# Patient Record
Sex: FEMALE | Race: ASIAN | Employment: FULL TIME | URBAN - METROPOLITAN AREA
[De-identification: names, ages, dates, MRNs, and addresses within clinical notes are randomized per-mention and may not be internally consistent; named-entity substitution may affect disease eponyms.]

---

## 2017-09-21 PROCEDURE — 88175 CYTOPATH C/V AUTO FLUID REDO: CPT | Performed by: OBSTETRICS & GYNECOLOGY

## 2018-05-16 ENCOUNTER — HOSPITAL ENCOUNTER (EMERGENCY)
Facility: HOSPITAL | Age: 28
Discharge: HOME | End: 2018-05-17
Attending: STUDENT IN AN ORGANIZED HEALTH CARE EDUCATION/TRAINING PROGRAM
Payer: COMMERCIAL

## 2018-05-16 DIAGNOSIS — R11.2 NON-INTRACTABLE VOMITING WITH NAUSEA, UNSPECIFIED VOMITING TYPE: ICD-10-CM

## 2018-05-16 DIAGNOSIS — N98.1 OVARIAN HYPERSTIMULATION SYNDROME: Primary | ICD-10-CM

## 2018-05-16 DIAGNOSIS — R10.9 ABDOMINAL PAIN, ACUTE: ICD-10-CM

## 2018-05-16 PROCEDURE — 96375 TX/PRO/DX INJ NEW DRUG ADDON: CPT

## 2018-05-16 PROCEDURE — 96361 HYDRATE IV INFUSION ADD-ON: CPT

## 2018-05-16 PROCEDURE — 81025 URINE PREGNANCY TEST: CPT

## 2018-05-16 PROCEDURE — 81003 URINALYSIS AUTO W/O SCOPE: CPT | Performed by: STUDENT IN AN ORGANIZED HEALTH CARE EDUCATION/TRAINING PROGRAM

## 2018-05-16 PROCEDURE — 96376 TX/PRO/DX INJ SAME DRUG ADON: CPT

## 2018-05-16 PROCEDURE — 86900 BLOOD TYPING SEROLOGIC ABO: CPT | Performed by: STUDENT IN AN ORGANIZED HEALTH CARE EDUCATION/TRAINING PROGRAM

## 2018-05-16 PROCEDURE — 99285 EMERGENCY DEPT VISIT HI MDM: CPT

## 2018-05-16 PROCEDURE — 85025 COMPLETE CBC W/AUTO DIFF WBC: CPT | Performed by: STUDENT IN AN ORGANIZED HEALTH CARE EDUCATION/TRAINING PROGRAM

## 2018-05-16 PROCEDURE — 96374 THER/PROPH/DIAG INJ IV PUSH: CPT

## 2018-05-16 PROCEDURE — 99284 EMERGENCY DEPT VISIT MOD MDM: CPT

## 2018-05-16 PROCEDURE — 86850 RBC ANTIBODY SCREEN: CPT | Performed by: STUDENT IN AN ORGANIZED HEALTH CARE EDUCATION/TRAINING PROGRAM

## 2018-05-16 PROCEDURE — 80053 COMPREHEN METABOLIC PANEL: CPT | Performed by: STUDENT IN AN ORGANIZED HEALTH CARE EDUCATION/TRAINING PROGRAM

## 2018-05-16 PROCEDURE — 86901 BLOOD TYPING SEROLOGIC RH(D): CPT | Performed by: STUDENT IN AN ORGANIZED HEALTH CARE EDUCATION/TRAINING PROGRAM

## 2018-05-16 RX ORDER — ONDANSETRON 2 MG/ML
4 INJECTION INTRAMUSCULAR; INTRAVENOUS ONCE
Status: COMPLETED | OUTPATIENT
Start: 2018-05-16 | End: 2018-05-17

## 2018-05-16 RX ORDER — HYDROMORPHONE HYDROCHLORIDE 1 MG/ML
0.5 INJECTION, SOLUTION INTRAMUSCULAR; INTRAVENOUS; SUBCUTANEOUS EVERY 30 MIN PRN
Status: DISCONTINUED | OUTPATIENT
Start: 2018-05-16 | End: 2018-05-17

## 2018-05-16 RX ORDER — METHYLPREDNISOLONE 16 MG/1
16 TABLET ORAL DAILY
COMMUNITY
End: 2018-10-10

## 2018-05-16 RX ORDER — CABERGOLINE 0.5 MG/1
0.25 TABLET ORAL
COMMUNITY
End: 2018-10-12

## 2018-05-16 RX ORDER — AZITHROMYCIN 500 MG/1
500 TABLET, FILM COATED ORAL DAILY
COMMUNITY
End: 2018-10-10 | Stop reason: ALTCHOICE

## 2018-05-17 ENCOUNTER — APPOINTMENT (OUTPATIENT)
Dept: CT IMAGING | Facility: HOSPITAL | Age: 28
End: 2018-05-17
Attending: STUDENT IN AN ORGANIZED HEALTH CARE EDUCATION/TRAINING PROGRAM
Payer: COMMERCIAL

## 2018-05-17 VITALS
HEIGHT: 69 IN | TEMPERATURE: 97 F | SYSTOLIC BLOOD PRESSURE: 113 MMHG | WEIGHT: 280 LBS | RESPIRATION RATE: 22 BRPM | DIASTOLIC BLOOD PRESSURE: 65 MMHG | BODY MASS INDEX: 41.47 KG/M2 | HEART RATE: 94 BPM | OXYGEN SATURATION: 90 %

## 2018-05-17 PROCEDURE — 74177 CT ABD & PELVIS W/CONTRAST: CPT | Performed by: STUDENT IN AN ORGANIZED HEALTH CARE EDUCATION/TRAINING PROGRAM

## 2018-05-17 RX ORDER — HYDROCODONE BITARTRATE AND ACETAMINOPHEN 5; 325 MG/1; MG/1
1-2 TABLET ORAL EVERY 4 HOURS PRN
Qty: 10 TABLET | Refills: 0 | Status: SHIPPED | OUTPATIENT
Start: 2018-05-17 | End: 2018-05-24

## 2018-05-17 RX ORDER — ONDANSETRON 4 MG/1
4 TABLET, ORALLY DISINTEGRATING ORAL EVERY 8 HOURS PRN
Qty: 10 TABLET | Refills: 0 | Status: SHIPPED | OUTPATIENT
Start: 2018-05-17 | End: 2018-05-24

## 2018-05-17 NOTE — ED INITIAL ASSESSMENT (HPI)
27YF c/c post op Pt state she had a egg removal on Tues.  Pt state that today she having abd pain, increased fatigue, and vomiting

## 2018-05-17 NOTE — ED NOTES
Attempted to call Dr. Mini Granados for consult with Dr. Kisha Bell and was only able to reach her RN on call.

## 2018-05-17 NOTE — ED PROVIDER NOTES
Patient Seen in: [facility] Emergency Department    History   Patient presents with:  Postop/Procedure Problem    Stated Complaint: post op problems     HPI    Patient is a 81-year-old female who presents emergency department reporting the abdominal friction rub. No murmur heard. Pulmonary/Chest: Effort normal. No respiratory distress. no wheezes. no rales. no tenderness. Abdominal: Soft. Bowel sounds are normal, no distension and no mass. Diffuse abdominal tenderness noted.  There is no reboun Final result               ANTIBODY SCREEN[[id number]]                                  Final result                 Please view results for these tests on the individual orders.    82 Hiral Kang (BLOOD TYPE)   ANTIBODY SCREEN   RAINBOW DRAW BLUE encounter diagnosis)  Abdominal pain, acute  Non-intractable vomiting with nausea, unspecified vomiting type    Disposition:  Discharge  5/17/2018  2:32 am    Follow-up:  your fertility specialitst    Call  at [address] [facility] for recheck        Medications Prescribed:

## 2018-05-21 ENCOUNTER — APPOINTMENT (OUTPATIENT)
Dept: GENERAL RADIOLOGY | Facility: HOSPITAL | Age: 28
End: 2018-05-21
Attending: EMERGENCY MEDICINE
Payer: COMMERCIAL

## 2018-05-21 ENCOUNTER — APPOINTMENT (OUTPATIENT)
Dept: CT IMAGING | Facility: HOSPITAL | Age: 28
End: 2018-05-21
Attending: EMERGENCY MEDICINE
Payer: COMMERCIAL

## 2018-05-21 ENCOUNTER — HOSPITAL ENCOUNTER (OUTPATIENT)
Facility: HOSPITAL | Age: 28
Setting detail: OBSERVATION
Discharge: HOME | End: 2018-05-23
Attending: EMERGENCY MEDICINE | Admitting: OBSTETRICS & GYNECOLOGY
Payer: COMMERCIAL

## 2018-05-21 DIAGNOSIS — N98.1 OVARIAN HYPERSTIMULATION SYNDROME: Primary | ICD-10-CM

## 2018-05-21 PROCEDURE — 93005 ELECTROCARDIOGRAM TRACING: CPT

## 2018-05-21 PROCEDURE — 81025 URINE PREGNANCY TEST: CPT

## 2018-05-21 PROCEDURE — 81001 URINALYSIS AUTO W/SCOPE: CPT | Performed by: EMERGENCY MEDICINE

## 2018-05-21 PROCEDURE — 74177 CT ABD & PELVIS W/CONTRAST: CPT | Performed by: EMERGENCY MEDICINE

## 2018-05-21 PROCEDURE — 71045 X-RAY EXAM CHEST 1 VIEW: CPT | Performed by: EMERGENCY MEDICINE

## 2018-05-21 PROCEDURE — 99285 EMERGENCY DEPT VISIT HI MDM: CPT

## 2018-05-21 PROCEDURE — 83735 ASSAY OF MAGNESIUM: CPT | Performed by: EMERGENCY MEDICINE

## 2018-05-21 PROCEDURE — 85025 COMPLETE CBC W/AUTO DIFF WBC: CPT | Performed by: EMERGENCY MEDICINE

## 2018-05-21 PROCEDURE — 84132 ASSAY OF SERUM POTASSIUM: CPT | Performed by: EMERGENCY MEDICINE

## 2018-05-21 PROCEDURE — 71275 CT ANGIOGRAPHY CHEST: CPT | Performed by: EMERGENCY MEDICINE

## 2018-05-21 PROCEDURE — 96374 THER/PROPH/DIAG INJ IV PUSH: CPT

## 2018-05-21 PROCEDURE — 84484 ASSAY OF TROPONIN QUANT: CPT | Performed by: EMERGENCY MEDICINE

## 2018-05-21 PROCEDURE — 84450 TRANSFERASE (AST) (SGOT): CPT | Performed by: EMERGENCY MEDICINE

## 2018-05-21 PROCEDURE — 80053 COMPREHEN METABOLIC PANEL: CPT | Performed by: EMERGENCY MEDICINE

## 2018-05-21 PROCEDURE — 96361 HYDRATE IV INFUSION ADD-ON: CPT

## 2018-05-21 PROCEDURE — 96376 TX/PRO/DX INJ SAME DRUG ADON: CPT

## 2018-05-21 PROCEDURE — 85610 PROTHROMBIN TIME: CPT | Performed by: EMERGENCY MEDICINE

## 2018-05-21 PROCEDURE — 93010 ELECTROCARDIOGRAM REPORT: CPT

## 2018-05-21 PROCEDURE — 83690 ASSAY OF LIPASE: CPT | Performed by: EMERGENCY MEDICINE

## 2018-05-21 RX ORDER — HYDROMORPHONE HYDROCHLORIDE 1 MG/ML
0.5 INJECTION, SOLUTION INTRAMUSCULAR; INTRAVENOUS; SUBCUTANEOUS ONCE
Status: COMPLETED | OUTPATIENT
Start: 2018-05-21 | End: 2018-05-21

## 2018-05-22 PROBLEM — N98.1 OVARIAN HYPERSTIMULATION SYNDROME: Status: ACTIVE | Noted: 2018-05-22

## 2018-05-22 PROCEDURE — 82962 GLUCOSE BLOOD TEST: CPT

## 2018-05-22 PROCEDURE — 85025 COMPLETE CBC W/AUTO DIFF WBC: CPT | Performed by: OBSTETRICS & GYNECOLOGY

## 2018-05-22 PROCEDURE — 84702 CHORIONIC GONADOTROPIN TEST: CPT | Performed by: OBSTETRICS & GYNECOLOGY

## 2018-05-22 PROCEDURE — P9045 ALBUMIN (HUMAN), 5%, 250 ML: HCPCS | Performed by: OBSTETRICS & GYNECOLOGY

## 2018-05-22 PROCEDURE — 80053 COMPREHEN METABOLIC PANEL: CPT | Performed by: OBSTETRICS & GYNECOLOGY

## 2018-05-22 RX ORDER — HYDROMORPHONE HYDROCHLORIDE 1 MG/ML
0.2 INJECTION, SOLUTION INTRAMUSCULAR; INTRAVENOUS; SUBCUTANEOUS EVERY 2 HOUR PRN
Status: DISCONTINUED | OUTPATIENT
Start: 2018-05-22 | End: 2018-05-23

## 2018-05-22 RX ORDER — ARIPIPRAZOLE 15 MG/1
40 TABLET ORAL EVERY 4 HOURS
Status: DISCONTINUED | OUTPATIENT
Start: 2018-05-22 | End: 2018-05-22

## 2018-05-22 RX ORDER — ENOXAPARIN SODIUM 100 MG/ML
0.5 INJECTION SUBCUTANEOUS DAILY
Status: DISCONTINUED | OUTPATIENT
Start: 2018-05-22 | End: 2018-05-22

## 2018-05-22 RX ORDER — ONDANSETRON 2 MG/ML
4 INJECTION INTRAMUSCULAR; INTRAVENOUS EVERY 6 HOURS PRN
Status: DISCONTINUED | OUTPATIENT
Start: 2018-05-22 | End: 2018-05-23

## 2018-05-22 RX ORDER — LEVOTHYROXINE SODIUM 0.05 MG/1
50 TABLET ORAL
COMMUNITY
End: 2020-01-04

## 2018-05-22 RX ORDER — ALBUMIN, HUMAN INJ 5% 5 %
500 SOLUTION INTRAVENOUS ONCE
Status: COMPLETED | OUTPATIENT
Start: 2018-05-22 | End: 2018-05-22

## 2018-05-22 RX ORDER — SODIUM CHLORIDE 9 MG/ML
INJECTION, SOLUTION INTRAVENOUS CONTINUOUS
Status: ACTIVE | OUTPATIENT
Start: 2018-05-22 | End: 2018-05-22

## 2018-05-22 RX ORDER — HYDROMORPHONE HYDROCHLORIDE 1 MG/ML
0.5 INJECTION, SOLUTION INTRAMUSCULAR; INTRAVENOUS; SUBCUTANEOUS ONCE
Status: COMPLETED | OUTPATIENT
Start: 2018-05-22 | End: 2018-05-22

## 2018-05-22 RX ORDER — SODIUM CHLORIDE 9 MG/ML
125 INJECTION, SOLUTION INTRAVENOUS CONTINUOUS
Status: DISCONTINUED | OUTPATIENT
Start: 2018-05-22 | End: 2018-05-23

## 2018-05-22 RX ORDER — FUROSEMIDE 10 MG/ML
20 INJECTION INTRAMUSCULAR; INTRAVENOUS ONCE
Status: COMPLETED | OUTPATIENT
Start: 2018-05-22 | End: 2018-05-22

## 2018-05-22 NOTE — CONSULTS
Pt is 32 y/o who is 6 days s/p egg retrieval - admitted for SOB and abdominal pain.  She is  not pg - is being followed by her R-E   Abdomen - mildly tender  /73 (BP Location: Left arm)   Pulse 72   Temp 97.6 °F (36.4 °C) (Oral)   Resp 18   Ht 5' 9\"

## 2018-05-22 NOTE — PROGRESS NOTES
[facility] Progress Note:  Anticoagulation Weight Dose Adjustment for enoxaparin (LOVENOX)    Lamar Aschoff is a 33year old female who has been prescribed enoxaparin (LOVENOX) for VTE prophylaxis.       Estimated Creatinine Clearance: 99 mL/min (based on S

## 2018-05-22 NOTE — IMAGING NOTE
Spoke with floor nurse Elder. Instructed patient may have clear liquid diet, to draw STAT PT/INR in a.m, verified patient is not on blood thinners (last dose of Lovenox in ER at 0317 5/22/18), and patient is consentable.  Scheduled procedure time 0800 in U

## 2018-05-22 NOTE — ED PROVIDER NOTES
Patient Seen in: [facility] Emergency Department    History   Patient presents with:  Dyspnea KIERRA SOB (respiratory)  Nausea/Vomiting/Diarrhea (gastrointestinal)    Stated Complaint: kierra/nausea    HPI    25-year-old female presents emergency room wit Temp 98.7 °F (37.1 °C) (Temporal)   Resp 18   Ht 175.3 cm (5' 9\")   Wt 127 kg   LMP 04/28/2018   SpO2 98%   BMI 41.35 kg/m²         Physical Exam    GENERAL: Patient is awake, alert, well-appearing, in no acute distress.   HEENT: Pupils equal round reactiv All other components within normal limits   CBC W/ DIFFERENTIAL - Abnormal; Notable for the following:     WBC 13.7 (*)     RBC 5.44 (*)     MCV 76.5 (*)     MCH 24.4 (*)     RDW 19.4 (*)     RDW-SD 50.1 (*)     Neutrophil Absolute Prelim 7.13 (*) thoracic aneurysm or dissection. New trace bilateral pleural effusions. Bilateral lower lobe subsegmental atelectasis. No consolidation or pneumothorax. No suspicious nodules or masses. Mediastinal and cardiac structures are unremarkable.  Visualize Unknown

## 2018-05-22 NOTE — ED NOTES
Assumed care, report received from [facility]. Pt resting in bed, family at bedside. She is in no acute distress. Pt dozing on and off, \"pain 5-6/10. \"

## 2018-05-23 ENCOUNTER — APPOINTMENT (OUTPATIENT)
Dept: ULTRASOUND IMAGING | Facility: HOSPITAL | Age: 28
End: 2018-05-23
Attending: OBSTETRICS & GYNECOLOGY
Payer: COMMERCIAL

## 2018-05-23 VITALS
SYSTOLIC BLOOD PRESSURE: 120 MMHG | HEART RATE: 75 BPM | DIASTOLIC BLOOD PRESSURE: 81 MMHG | HEIGHT: 69 IN | OXYGEN SATURATION: 100 % | WEIGHT: 280 LBS | TEMPERATURE: 98 F | BODY MASS INDEX: 41.47 KG/M2 | RESPIRATION RATE: 18 BRPM

## 2018-05-23 PROCEDURE — 49083 ABD PARACENTESIS W/IMAGING: CPT | Performed by: OBSTETRICS & GYNECOLOGY

## 2018-05-23 PROCEDURE — 85610 PROTHROMBIN TIME: CPT | Performed by: OBSTETRICS & GYNECOLOGY

## 2018-05-23 PROCEDURE — 76705 ECHO EXAM OF ABDOMEN: CPT | Performed by: OBSTETRICS & GYNECOLOGY

## 2018-05-23 PROCEDURE — 84132 ASSAY OF SERUM POTASSIUM: CPT | Performed by: OBSTETRICS & GYNECOLOGY

## 2018-05-23 RX ORDER — POTASSIUM CHLORIDE 20 MEQ/1
40 TABLET, EXTENDED RELEASE ORAL ONCE
Status: COMPLETED | OUTPATIENT
Start: 2018-05-23 | End: 2018-05-23

## 2018-05-23 NOTE — PAYOR COMM NOTE
--------------  ADMISSION REVIEW     Payor: Heron Cruz #:  [id number]  Authorization Number: N/A    Admit date: N/A  Admit time: N/A       Patient Seen in: [facility] Emergency Department    History   Stated Francesca (*)     All other components within normal limits   URINALYSIS WITH CULTURE REFLEX - Abnormal; Notable for the following:     Urine Color Colette (*)     Clarity Urine Hazy (*)     Spec Gravity 1.031 (*)     Protein Urine 30  (*)     Urobilinogen Urine 4.0 ( AND NAUSEA  Additional Information (per [facility]):  Comparison May 17, 2018    CTA chest with IV contrast  CT abdomen pelvis with IV contrast    IMPRESSION:    Chest:  No pulmonary embolus. Technically adequate study.  No thoracic aneurysm or diss - mildly tender  /73 (BP Location: Left arm)   Pulse 72   Temp 97.6 °F (36.4 °C) (Oral)   Resp 18   Ht 5' 9\" (1.753 m)   Wt 280 lb (127 kg)   LMP 04/28/2018   SpO2 100%   BMI 41.35 kg/m²   States SOB has improved  Labs significant for low albumin -

## 2018-05-23 NOTE — PROGRESS NOTES
NURSING DISCHARGE NOTE    Discharged Home via Wheelchair. Accompanied by Family member and Support staff  Belongings Taken by patient/family DISCUSSED D/C INSTRUCTIONS WITH PATIENT AND FAMILY . ANSWERED ALL QUESTIONS . VERBALIZED UNDERSTANDING .

## 2018-05-23 NOTE — PROGRESS NOTES
[facility]  GYN Progress  Note    Annemarie Self Patient Status:  Observation    1990 MRN [id number]   [facility] 3NW-A Attending Didi Mata MD   Hosp Day # 0 PCP Zana Garcia MD     SUBJECTIVE:    Patient admitted for abdo

## 2018-10-12 PROCEDURE — 87086 URINE CULTURE/COLONY COUNT: CPT | Performed by: OBSTETRICS & GYNECOLOGY

## 2018-10-22 PROCEDURE — 86850 RBC ANTIBODY SCREEN: CPT | Performed by: OBSTETRICS & GYNECOLOGY

## 2018-10-22 PROCEDURE — 86901 BLOOD TYPING SEROLOGIC RH(D): CPT | Performed by: OBSTETRICS & GYNECOLOGY

## 2018-10-22 PROCEDURE — 86900 BLOOD TYPING SEROLOGIC ABO: CPT | Performed by: OBSTETRICS & GYNECOLOGY

## 2018-10-22 PROCEDURE — 86780 TREPONEMA PALLIDUM: CPT | Performed by: OBSTETRICS & GYNECOLOGY

## 2018-10-22 PROCEDURE — 87389 HIV-1 AG W/HIV-1&-2 AB AG IA: CPT | Performed by: OBSTETRICS & GYNECOLOGY

## 2018-10-22 PROCEDURE — 86762 RUBELLA ANTIBODY: CPT | Performed by: OBSTETRICS & GYNECOLOGY

## 2018-11-29 PROCEDURE — 36415 COLL VENOUS BLD VENIPUNCTURE: CPT | Performed by: OBSTETRICS & GYNECOLOGY

## 2018-11-29 PROCEDURE — 82105 ALPHA-FETOPROTEIN SERUM: CPT | Performed by: OBSTETRICS & GYNECOLOGY

## 2018-12-17 ENCOUNTER — HOSPITAL ENCOUNTER (INPATIENT)
Facility: HOSPITAL | Age: 28
LOS: 4 days | Discharge: HOME | DRG: 819 | End: 2018-12-21
Attending: OBSTETRICS & GYNECOLOGY | Admitting: OBSTETRICS & GYNECOLOGY
Payer: COMMERCIAL

## 2018-12-17 PROBLEM — O34.32 INCOMPETENT CERVIX IN PREGNANCY, ANTEPARTUM, SECOND TRIMESTER (HCC): Status: ACTIVE | Noted: 2018-12-17

## 2018-12-17 PROBLEM — O34.32 INCOMPETENT CERVIX IN PREGNANCY, ANTEPARTUM, SECOND TRIMESTER: Status: ACTIVE | Noted: 2018-12-17

## 2018-12-17 LAB
BASOPHILS # BLD AUTO: 0.06 X10(3) UL (ref 0–0.1)
BASOPHILS NFR BLD AUTO: 0.6 %
EOSINOPHIL # BLD AUTO: 0.26 X10(3) UL (ref 0–0.3)
EOSINOPHIL NFR BLD AUTO: 2.4 %
ERYTHROCYTE [DISTWIDTH] IN BLOOD BY AUTOMATED COUNT: 14.2 % (ref 11.5–16)
HCT VFR BLD AUTO: 42.9 % (ref 34–50)
HGB BLD-MCNC: 13.5 G/DL (ref 12–16)
IMMATURE GRANULOCYTE COUNT: 0.04 X10(3) UL (ref 0–1)
IMMATURE GRANULOCYTE RATIO %: 0.4 %
LYMPHOCYTES # BLD AUTO: 3.32 X10(3) UL (ref 0.9–4)
LYMPHOCYTES NFR BLD AUTO: 30.6 %
MCH RBC QN AUTO: 27.4 PG (ref 27–33.2)
MCHC RBC AUTO-ENTMCNC: 31.5 G/DL (ref 31–37)
MCV RBC AUTO: 87 FL (ref 81–100)
MONOCYTES # BLD AUTO: 0.74 X10(3) UL (ref 0.1–1)
MONOCYTES NFR BLD AUTO: 6.8 %
NEUTROPHIL ABS PRELIM: 6.42 X10 (3) UL (ref 1.3–6.7)
NEUTROPHILS # BLD AUTO: 6.42 X10(3) UL (ref 1.3–6.7)
NEUTROPHILS NFR BLD AUTO: 59.2 %
PLATELET # BLD AUTO: 264 10(3)UL (ref 150–450)
RBC # BLD AUTO: 4.93 X10(6)UL (ref 3.8–5.1)
RED CELL DISTRIBUTION WIDTH-SD: 45.6 FL (ref 35.1–46.3)
WBC # BLD AUTO: 10.8 X10(3) UL (ref 4–13)

## 2018-12-17 PROCEDURE — 85025 COMPLETE CBC W/AUTO DIFF WBC: CPT | Performed by: OBSTETRICS & GYNECOLOGY

## 2018-12-17 RX ORDER — INDOMETHACIN 50 MG/1
100 CAPSULE ORAL ONCE
Status: COMPLETED | OUTPATIENT
Start: 2018-12-17 | End: 2018-12-17

## 2018-12-17 RX ORDER — CALCIUM CARBONATE 200(500)MG
1000 TABLET,CHEWABLE ORAL
Status: DISCONTINUED | OUTPATIENT
Start: 2018-12-17 | End: 2018-12-21

## 2018-12-17 RX ORDER — ACETAMINOPHEN 500 MG
500 TABLET ORAL EVERY 6 HOURS PRN
Status: DISCONTINUED | OUTPATIENT
Start: 2018-12-17 | End: 2018-12-21

## 2018-12-17 RX ORDER — DOCUSATE SODIUM 100 MG/1
100 CAPSULE, LIQUID FILLED ORAL 2 TIMES DAILY
Status: DISCONTINUED | OUTPATIENT
Start: 2018-12-17 | End: 2018-12-21

## 2018-12-17 RX ORDER — LEVOTHYROXINE SODIUM 0.05 MG/1
50 TABLET ORAL DAILY
Status: DISCONTINUED | OUTPATIENT
Start: 2018-12-18 | End: 2018-12-21

## 2018-12-17 RX ORDER — CHOLECALCIFEROL (VITAMIN D3) 25 MCG
1 TABLET,CHEWABLE ORAL DAILY
Status: DISCONTINUED | OUTPATIENT
Start: 2018-12-17 | End: 2018-12-18

## 2018-12-17 RX ORDER — LEVOTHYROXINE SODIUM 0.05 MG/1
50 TABLET ORAL
Status: DISCONTINUED | OUTPATIENT
Start: 2018-12-17 | End: 2018-12-17

## 2018-12-17 RX ORDER — INDOMETHACIN 50 MG/1
50 CAPSULE ORAL EVERY 6 HOURS
Status: DISCONTINUED | OUTPATIENT
Start: 2018-12-18 | End: 2018-12-18

## 2018-12-17 RX ORDER — ACETAMINOPHEN 500 MG
1000 TABLET ORAL EVERY 6 HOURS PRN
Status: DISCONTINUED | OUTPATIENT
Start: 2018-12-17 | End: 2018-12-21

## 2018-12-17 RX ORDER — DEXTROSE, SODIUM CHLORIDE, SODIUM LACTATE, POTASSIUM CHLORIDE, AND CALCIUM CHLORIDE 5; .6; .31; .03; .02 G/100ML; G/100ML; G/100ML; G/100ML; G/100ML
INJECTION, SOLUTION INTRAVENOUS CONTINUOUS
Status: DISCONTINUED | OUTPATIENT
Start: 2018-12-17 | End: 2018-12-20

## 2018-12-17 RX ORDER — ZOLPIDEM TARTRATE 5 MG/1
5 TABLET ORAL NIGHTLY PRN
Status: DISCONTINUED | OUTPATIENT
Start: 2018-12-17 | End: 2018-12-21

## 2018-12-18 PROCEDURE — 59320 REVISION OF CERVIX: CPT

## 2018-12-18 PROCEDURE — 76815 OB US LIMITED FETUS(S): CPT | Performed by: OBSTETRICS & GYNECOLOGY

## 2018-12-18 PROCEDURE — 76817 TRANSVAGINAL US OBSTETRIC: CPT | Performed by: OBSTETRICS & GYNECOLOGY

## 2018-12-18 PROCEDURE — 0UVC7ZZ RESTRICTION OF CERVIX, VIA NATURAL OR ARTIFICIAL OPENING: ICD-10-PCS | Performed by: OBSTETRICS & GYNECOLOGY

## 2018-12-18 RX ORDER — INDOMETHACIN 50 MG/1
100 CAPSULE ORAL ONCE
Status: COMPLETED | OUTPATIENT
Start: 2018-12-18 | End: 2018-12-18

## 2018-12-18 RX ORDER — METRONIDAZOLE 500 MG/100ML
500 INJECTION, SOLUTION INTRAVENOUS EVERY 8 HOURS
Status: COMPLETED | OUTPATIENT
Start: 2018-12-18 | End: 2018-12-20

## 2018-12-18 RX ORDER — SODIUM CHLORIDE, SODIUM LACTATE, POTASSIUM CHLORIDE, CALCIUM CHLORIDE 600; 310; 30; 20 MG/100ML; MG/100ML; MG/100ML; MG/100ML
INJECTION, SOLUTION INTRAVENOUS CONTINUOUS
Status: DISCONTINUED | OUTPATIENT
Start: 2018-12-18 | End: 2018-12-21

## 2018-12-18 RX ORDER — CEFAZOLIN SODIUM/WATER 2 G/20 ML
2 SYRINGE (ML) INTRAVENOUS EVERY 8 HOURS
Status: COMPLETED | OUTPATIENT
Start: 2018-12-18 | End: 2018-12-20

## 2018-12-18 RX ORDER — NITROGLYCERIN 20 MG/100ML
INJECTION INTRAVENOUS
Status: COMPLETED
Start: 2018-12-18 | End: 2018-12-18

## 2018-12-18 RX ORDER — CHOLECALCIFEROL (VITAMIN D3) 25 MCG
1 TABLET,CHEWABLE ORAL DAILY
Status: DISCONTINUED | OUTPATIENT
Start: 2018-12-18 | End: 2018-12-21

## 2018-12-18 RX ORDER — CEFAZOLIN SODIUM/WATER 2 G/20 ML
SYRINGE (ML) INTRAVENOUS
Status: DISCONTINUED | OUTPATIENT
Start: 2018-12-18 | End: 2018-12-20

## 2018-12-18 RX ORDER — NITROGLYCERIN 20 MG/100ML
100 INJECTION INTRAVENOUS ONCE
Status: COMPLETED | OUTPATIENT
Start: 2018-12-18 | End: 2018-12-18

## 2018-12-18 RX ORDER — INDOMETHACIN 50 MG/1
50 CAPSULE ORAL EVERY 6 HOURS
Status: DISCONTINUED | OUTPATIENT
Start: 2018-12-19 | End: 2018-12-20

## 2018-12-18 RX ORDER — ONDANSETRON 2 MG/ML
4 INJECTION INTRAMUSCULAR; INTRAVENOUS ONCE AS NEEDED
Status: ACTIVE | OUTPATIENT
Start: 2018-12-18 | End: 2018-12-18

## 2018-12-18 NOTE — PLAN OF CARE
Problem: Patient/Family Goals  Goal: Patient/Family Long Term Goal  Patient's Long Term Goal: maintain pregnancy    Interventions:  -   - See additional Care Plan goals for specific interventions  Outcome: Progressing    Goal: Patient/Family [address]eda

## 2018-12-18 NOTE — PLAN OF CARE
Problem: Patient/Family Goals  Goal: Patient/Family Short Term Goal  Patient's Short Term Goal: positive support for hospitalization    Interventions:   -   - See additional Care Plan goals for specific interventions  Outcome: Progressing      Problem: ANT Progressing      Problem: PAIN - ADULT  Goal: Verbalizes/displays adequate comfort level or patient's stated pain goal  INTERVENTIONS:  - Encourage pt to monitor pain and request assistance  - Assess pain using appropriate pain scale  - Administer analgesi

## 2018-12-18 NOTE — PROGRESS NOTES
Pt is a 33year old female admitted to 117/117-A. Patient presents with:   Complication: pt had u/s in office today cervix was shown to be dilated     Pt is  19w4d intra-uterine pregnancy. History obtained, consents signed.  Oriented to r

## 2018-12-18 NOTE — CONSULTS
[facility]  Maternal-Fetal Medicine Inpatient Consultation    Date of Admission:  12/17/2018  Date of Consult:  12/18/2018    Reason for Consult:   Incompetent cervix, morbid obesity, IVF twin gestation    History of Present Illness:  Rhonda Negron Problems Maternal Grandfather    • No Known Problems Paternal Grandmother    • No Known Problems Paternal Grandfather    • Cancer Maternal Aunt         breast   • Breast Cancer Maternal Aunt       reports that  has never smoked.  she has never used smokeles HGB 13.5 12/17/2018    HCT 42.9 12/17/2018    .0 12/17/2018       Fetal Ultrasound: See imaging note      NARRATIVE:   PREOPERATIVE COUNSELING  I reviewed that cerclage may help reduce the risk of a previable birth or reduce the risk or delay the tonight    Thank you for allowing me to participate in the care of your patient. Please do not hesitate to contact me if additional questions or concerns arise.   The majority of the time (>50%) was spent in review of records, consultation and coordination

## 2018-12-18 NOTE — PROGRESS NOTES
Pericare given and underpad changed. Pt moves well from side to side. To antepartum room via patient bed in stable condition.

## 2018-12-18 NOTE — PLAN OF CARE
Rn at bedside. Pt in trendelenburg. toco tracing. plan of care discussed, pt verbalizes understanding. Pt denies any need at this time.

## 2018-12-18 NOTE — OPERATIVE REPORT
Preoperative Diagnosis:  1. Twin IUP at 19w5d                                      2.   Incompetent cervix                                      3.   Morbid obesity     4.   IVF pregnancy    Postoperative diagnosis:    Same    Procedure:   Rescue Cerclage

## 2018-12-18 NOTE — H&P
[facility] Patient Status:  Observation    1990 MRN [id number]   Location [address] [place] [address] Attending Rosendo Lewis MD   Hosp Day # 0 PCP Joo Stewart MD     Date of Admission:  20 Cap Take 1 capsule by mouth daily.  Disp:  Rfl:  12/16/2018 at 2130   Levothyroxine Sodium 50 MCG Oral Tab Take 50 mcg by mouth before breakfast. Disp:  Rfl:  12/16/2018 at 2130     Allergies:   Dog Dander [Dander]     RASH  Pollen                  RASH

## 2018-12-18 NOTE — PROGRESS NOTES
Twin A fhr 145 via doppler in recovery room, unable to obtain twin B, dr Eladio Shultz in recovery and located via ultrasound second twin and fhr noted at 150 bpm. Dr Connie Wilcox and Dr Eladio Shultz and pt aware.

## 2018-12-18 NOTE — PROGRESS NOTES
Report given to Tito Tolentino Rn d/t shift change. Pt stable on bedrest in trendelenburg. POC explained to pt and . Pt verbalized understanding. Call light with reach.

## 2018-12-18 NOTE — CM/SW NOTE
Team rounds done on pt. Team reviewed pt orders, pt plan of care, and possible discharge orders. Team present for rounds: C. [address] Jean Raymond, RN ELIZABETH, and RN caring for pt.

## 2018-12-18 NOTE — PROGRESS NOTES
S: Pt not feeling ctx or bleeding. Babies moving.     O:   12/18/18  0723   BP: 124/67   Pulse: 91   Resp: 17   Temp: 97.9 °F (36.6 °C)   FHT present x2  Abdomen: soft NT ND    Recent Labs   Lab  12/17/18 2025   RBC  4.93   HGB  13.5   HCT  42.9   MCV  87

## 2018-12-18 NOTE — IMAGING NOTE
General Evaluation:  Fetus 1:  Fetal heart activity: present. Fetal heart rate: 132 bpm.   Presentation: breech. Fetal movement: visible. Amniotic Fluid: normal.   Placenta: anterior. Fetus 2:  Fetal heart activity: present.  Fetal heart rate: 136 b

## 2018-12-19 PROCEDURE — 76810 OB US >/= 14 WKS ADDL FETUS: CPT | Performed by: OBSTETRICS & GYNECOLOGY

## 2018-12-19 PROCEDURE — 76811 OB US DETAILED SNGL FETUS: CPT | Performed by: OBSTETRICS & GYNECOLOGY

## 2018-12-19 PROCEDURE — 76805 OB US >/= 14 WKS SNGL FETUS: CPT

## 2018-12-19 PROCEDURE — 76817 TRANSVAGINAL US OBSTETRIC: CPT | Performed by: OBSTETRICS & GYNECOLOGY

## 2018-12-19 NOTE — PROGRESS NOTES
Sybil Patient Status:  Inpatient    1990 MRN [id number]   Location [address] [facility] Attending Baljeet Chase MD   Hosp Day # 2 PCP Yue Lemus MD     SUBJECTIVE:    Admitted for rescue c

## 2018-12-19 NOTE — PROGRESS NOTES
Received report from [place]. Assumed care of patient.

## 2018-12-19 NOTE — IMAGING NOTE
History: Age: 33 years. : 3 Para: 2. Previous pregnancies: Children born : 2.  Living children: 2.   Obstetric History:   Details on previous pregnancies: Living children <37W: 2.  ______________________________________________________________ 20w6d)  .1 mm 38th% 19w5d (19w0d to 20w2d)  FL 33.0 mm 59th% 20w2d (18w4d to 22w1d)  OFD 57.8 mm 26th% 19w3d  TCD 18.5 mm 12th% 18w4d  HUM 32.5 mm 82nd% 20w6d  VENTRp 6.6 mm n/a  CM 3.4 mm 8th%  NUCHAL FOLD 3.38 mm  HC/AC Ratio 1.161  40th%  FL/AC Ra mm  Cervical length with fundal pressure: 15 mm. Funnel width: 14.0 mm. Funnel length: 22.0 mm.    Cervix post-stitch: 15.0 mm.  ____________________________________________________________________________  Comments:    Ultrasound findings:      TWIN A

## 2018-12-19 NOTE — CONSULTS
[facility]    Maternal Fetal Medicine follow-up consult note    Maty King Patient Status:  Inpatient    1990 MRN [id number]   Location [address] [facility] Attending Virgie Arcos MD   Hosp Day # 2 PCP Mil Frgaoso MD     HARDING vaginal progesterone  · Complete the antibiotic course as written  · Indocin is to complete tonight at midnight  · Anticipate she will be cleared for discharge by the end of the week.   She is to be on pelvic rest at home and modified bedrest.  She is to ha

## 2018-12-20 RX ORDER — INDOMETHACIN 50 MG/1
CAPSULE ORAL
Status: COMPLETED
Start: 2018-12-20 | End: 2018-12-20

## 2018-12-20 RX ORDER — INDOMETHACIN 50 MG/1
50 CAPSULE ORAL ONCE
Status: COMPLETED | OUTPATIENT
Start: 2018-12-20 | End: 2018-12-20

## 2018-12-20 RX ORDER — ENOXAPARIN SODIUM 100 MG/ML
40 INJECTION SUBCUTANEOUS DAILY
Status: DISCONTINUED | OUTPATIENT
Start: 2018-12-20 | End: 2018-12-21

## 2018-12-20 NOTE — CONSULTS
[facility]    Maternal Fetal Medicine follow-up consult note    Miky Galvez Patient Status:  Inpatient    1990 MRN [id number]   Location [address] [facility] Attending Tung Carson MD   Hosp Day # 3 PCP Estela Solorio MD     HARDING views in about 4 weeks. · Additional follow-up in one week for CL after discharge will be arranged at the time of discharge    Questions/concerns were discussed with patient and/or family at bedside. Discussed with Dr. Sarah Beth Ramos.   Total Time spent with pa

## 2018-12-20 NOTE — PROGRESS NOTES
Sybil Patient Status:  Inpatient    1990 MRN [id number]   Location [address] [place] [address] Attending Nahum Reynolds MD   Hosp Day # 3 PCP Xavier Juares MD     SUBJECTIVE:    Interval History:

## 2018-12-21 VITALS
TEMPERATURE: 99 F | WEIGHT: 289 LBS | OXYGEN SATURATION: 100 % | SYSTOLIC BLOOD PRESSURE: 117 MMHG | HEIGHT: 69 IN | DIASTOLIC BLOOD PRESSURE: 66 MMHG | RESPIRATION RATE: 18 BRPM | BODY MASS INDEX: 42.8 KG/M2 | HEART RATE: 85 BPM

## 2018-12-21 RX ORDER — ENOXAPARIN SODIUM 100 MG/ML
40 INJECTION SUBCUTANEOUS DAILY
Qty: 30 SYRINGE | Refills: 4 | Status: SHIPPED | OUTPATIENT
Start: 2018-12-21 | End: 2019-01-15

## 2018-12-21 NOTE — CONSULTS
[facility]    Maternal Fetal Medicine Progress Note    Maty King Patient Status:  Inpatient    1990 MRN [id number]   Location [address] [facility] Attending Virgie Arcos MD   Hosp Day # 4 PCP Mil Fragoso MD     SUBJECTIVE: with patient and coordinating care:  15 minutes    Gypsy Collins.  Barbara Hinds M.D.  12/21/2018  10:38 AM

## 2018-12-21 NOTE — PROGRESS NOTES
Discharged to home per wheelchair in stable condition with written and verbal instructions. All questions answered.

## 2018-12-21 NOTE — PROGRESS NOTES
[facility] Patient Status:  Inpatient    1990 MRN [id number]   Location [address] [facility] Attending Jojo Stein MD   Hosp Day # 4 PCP Genoveva Kirk MD     SUBJECTIVE:    Interval History: Arturo Valadez

## 2018-12-24 NOTE — PAYOR COMM NOTE
--------------  ADMISSION REVIEW     Payor: Heron Lima [facility] #:  [id number]  Authorization Number: [id number]     Admit date: 12/17/18  Admit time: [id number] Juliann Hernandez       Admitting Physician: Mejia Marie MD  Attending Physician:  Fernando Scott Father    • High Blood Pressure Father    • Heart Disease Father    • Heart Surgery Father    • High Blood Pressure Mother    • No Known Problems Maternal Grandmother    • No Known Problems Maternal Grandfather    • No Known Problems Paternal Grandmother Consultation  Reason for Consult:   Incompetent cervix, IVF twin gestation  /67   Pulse 91   Temp 97.9 °F (36.6 °C) (Oral)   Resp 17   Ht 5' 9\" (1.753 m)   Wt 289 lb (131.1 kg)   LMP 04/28/2018   BMI 42.68 kg/m²     IMPRESSION:   1.   Twin IUP at 19w MEDICATIONS ADMINISTERED IN LAST 1 DAY:   lactated ringers infusion, , Intravenous, Continuous  •  influenza vaccine split quad (FLULAVAL) ages 6 months to 65 years inj 0.5ml, 0.5 mL, Intramuscular, Prior to discharge  •  dextrose 5 % /lactated ringers

## 2018-12-27 NOTE — PROGRESS NOTES
Dayton Case    Dear Dr. Rachael Main    Thank you for requesting ultrasound evaluation and maternal fetal medicine consultation on your patient Alton Gan.   As you are aware she is a 33year old female  with a twin pregna follow-up  Appropriate interval growth is noted. The patient denies any signs or symptoms of  labor. There is no clinical evidence of preeclampsia. See follow-up recommendations below. S/P RESCUE CERCLAGE  Performed on 18 by Dr. Tiffany Harrell.   C

## 2018-12-28 ENCOUNTER — OFFICE VISIT (OUTPATIENT)
Dept: PERINATAL CARE | Facility: HOSPITAL | Age: 28
End: 2018-12-28
Attending: OBSTETRICS & GYNECOLOGY
Payer: COMMERCIAL

## 2018-12-28 VITALS
BODY MASS INDEX: 42.95 KG/M2 | HEIGHT: 69 IN | DIASTOLIC BLOOD PRESSURE: 87 MMHG | WEIGHT: 290 LBS | HEART RATE: 120 BPM | SYSTOLIC BLOOD PRESSURE: 136 MMHG

## 2018-12-28 DIAGNOSIS — O34.32 INCOMPETENT CERVIX IN PREGNANCY, ANTEPARTUM, SECOND TRIMESTER: ICD-10-CM

## 2018-12-28 DIAGNOSIS — O30.042 DICHORIONIC DIAMNIOTIC TWIN PREGNANCY IN SECOND TRIMESTER: ICD-10-CM

## 2018-12-28 PROCEDURE — 76815 OB US LIMITED FETUS(S): CPT | Performed by: OBSTETRICS & GYNECOLOGY

## 2018-12-28 PROCEDURE — 99213 OFFICE O/P EST LOW 20 MIN: CPT | Performed by: OBSTETRICS & GYNECOLOGY

## 2018-12-28 PROCEDURE — 76817 TRANSVAGINAL US OBSTETRIC: CPT | Performed by: OBSTETRICS & GYNECOLOGY

## 2019-01-05 NOTE — DISCHARGE SUMMARY
[facility]    Discharge Summary    [facility] Patient Status:  Inpatient    1990 MRN [id number]   Location [address] [facility] Attending No att. providers found   Hosp Day # 4 PCP Eliseo Warner MD     Date of Admission:  placement    Complications: none    Discharge Condition: Stable         Discharge Medications:      Discharge Medications      START taking these medications      Instructions Prescription details   Enoxaparin Sodium 40 MG/0.4ML Soln  Commonly known as:  L TAMMY  1/4/2019  9:28 PM

## 2019-01-17 NOTE — PROGRESS NOTES
Ramirez Harry  Dear Dr. José Manuel Gandara     Thank you for requesting ultrasound evaluation and maternal fetal medicine consultation on your patient Vimal Crockett.   As you are aware she is a 33year old female  with a twin preg FACOG    Image quality:  Limited  Situs:  Normal  Position of stomach:  Left sided  Heart size:  Normal  Position of apex:  normal  Systemic veins:  suboptimal  Pulmonary veins:  suboptimal  Atrial septum:  suboptimal  Flow at foramen ovale:  Unable to det conception  · Incompetent cervix, rescue cerclage 12/18/18 by Dr. Garsia Flank:   · Continue nightly vaginal progesterone  · Concur with plan for prophylactic enoxaparin while the patient remains on home bedrest  · Continue monthly growth ultra

## 2019-01-18 ENCOUNTER — OFFICE VISIT (OUTPATIENT)
Dept: PERINATAL CARE | Facility: HOSPITAL | Age: 29
End: 2019-01-18
Attending: OBSTETRICS & GYNECOLOGY
Payer: COMMERCIAL

## 2019-01-18 VITALS
DIASTOLIC BLOOD PRESSURE: 81 MMHG | WEIGHT: 291 LBS | BODY MASS INDEX: 43 KG/M2 | HEART RATE: 121 BPM | SYSTOLIC BLOOD PRESSURE: 138 MMHG

## 2019-01-18 DIAGNOSIS — O34.32 INCOMPETENT CERVIX IN PREGNANCY, ANTEPARTUM, SECOND TRIMESTER: ICD-10-CM

## 2019-01-18 DIAGNOSIS — O09.812 PREGNANCY RESULTING FROM ASSISTED REPRODUCTIVE TECHNOLOGY, SECOND TRIMESTER: ICD-10-CM

## 2019-01-18 DIAGNOSIS — O30.042 DICHORIONIC DIAMNIOTIC TWIN PREGNANCY IN SECOND TRIMESTER: ICD-10-CM

## 2019-01-18 PROCEDURE — 93325 DOPPLER ECHO COLOR FLOW MAPG: CPT | Performed by: OBSTETRICS & GYNECOLOGY

## 2019-01-18 PROCEDURE — 76825 ECHO EXAM OF FETAL HEART: CPT | Performed by: OBSTETRICS & GYNECOLOGY

## 2019-01-18 PROCEDURE — 99215 OFFICE O/P EST HI 40 MIN: CPT | Performed by: OBSTETRICS & GYNECOLOGY

## 2019-01-18 PROCEDURE — 76827 ECHO EXAM OF FETAL HEART: CPT | Performed by: OBSTETRICS & GYNECOLOGY

## 2019-01-22 ENCOUNTER — HOSPITAL ENCOUNTER (INPATIENT)
Facility: HOSPITAL | Age: 29
LOS: 7 days | Discharge: HOME | End: 2019-01-29
Attending: OBSTETRICS & GYNECOLOGY | Admitting: OBSTETRICS & GYNECOLOGY
Payer: COMMERCIAL

## 2019-01-22 PROBLEM — Z34.90 PREGNANCY (HCC): Status: ACTIVE | Noted: 2019-01-22

## 2019-01-22 PROBLEM — Z34.90 PREGNANCY: Status: ACTIVE | Noted: 2019-01-22

## 2019-01-23 ENCOUNTER — APPOINTMENT (OUTPATIENT)
Dept: ULTRASOUND IMAGING | Facility: HOSPITAL | Age: 29
End: 2019-01-23
Attending: OBSTETRICS & GYNECOLOGY
Payer: COMMERCIAL

## 2019-01-23 LAB
ANTIBODY SCREEN: NEGATIVE
BASOPHILS # BLD AUTO: 0.03 X10(3) UL (ref 0–0.1)
BASOPHILS NFR BLD AUTO: 0.3 %
EOSINOPHIL # BLD AUTO: 0.18 X10(3) UL (ref 0–0.3)
EOSINOPHIL NFR BLD AUTO: 1.8 %
ERYTHROCYTE [DISTWIDTH] IN BLOOD BY AUTOMATED COUNT: 14.3 % (ref 11.5–16)
FETAL FIBRINECTIN: POSITIVE
HCT VFR BLD AUTO: 39.2 % (ref 34–50)
HGB BLD-MCNC: 13.3 G/DL (ref 12–16)
IMMATURE GRANULOCYTE COUNT: 0.05 X10(3) UL (ref 0–1)
IMMATURE GRANULOCYTE RATIO %: 0.5 %
LYMPHOCYTES # BLD AUTO: 2.84 X10(3) UL (ref 0.9–4)
LYMPHOCYTES NFR BLD AUTO: 27.8 %
MCH RBC QN AUTO: 28.9 PG (ref 27–33.2)
MCHC RBC AUTO-ENTMCNC: 33.9 G/DL (ref 31–37)
MCV RBC AUTO: 85 FL (ref 81–100)
MONOCYTES # BLD AUTO: 0.84 X10(3) UL (ref 0.1–1)
MONOCYTES NFR BLD AUTO: 8.2 %
NEUTROPHIL ABS PRELIM: 6.27 X10 (3) UL (ref 1.3–6.7)
NEUTROPHILS # BLD AUTO: 6.27 X10(3) UL (ref 1.3–6.7)
NEUTROPHILS NFR BLD AUTO: 61.4 %
PLATELET # BLD AUTO: 225 10(3)UL (ref 150–450)
RBC # BLD AUTO: 4.61 X10(6)UL (ref 3.8–5.1)
RED CELL DISTRIBUTION WIDTH-SD: 43.6 FL (ref 35.1–46.3)
RH BLOOD TYPE: POSITIVE
T PALLIDUM AB SER QL IA: NONREACTIVE
WBC # BLD AUTO: 10.2 X10(3) UL (ref 4–13)

## 2019-01-23 PROCEDURE — 76817 TRANSVAGINAL US OBSTETRIC: CPT | Performed by: OBSTETRICS & GYNECOLOGY

## 2019-01-23 PROCEDURE — 76810 OB US >/= 14 WKS ADDL FETUS: CPT | Performed by: OBSTETRICS & GYNECOLOGY

## 2019-01-23 PROCEDURE — 76805 OB US >/= 14 WKS SNGL FETUS: CPT | Performed by: OBSTETRICS & GYNECOLOGY

## 2019-01-23 PROCEDURE — 3E0233Z INTRODUCTION OF ANTI-INFLAMMATORY INTO MUSCLE, PERCUTANEOUS APPROACH: ICD-10-PCS | Performed by: OBSTETRICS & GYNECOLOGY

## 2019-01-23 RX ORDER — BETAMETHASONE SODIUM PHOSPHATE AND BETAMETHASONE ACETATE 3; 3 MG/ML; MG/ML
INJECTION, SUSPENSION INTRA-ARTICULAR; INTRALESIONAL; INTRAMUSCULAR; SOFT TISSUE
Status: COMPLETED
Start: 2019-01-23 | End: 2019-01-23

## 2019-01-23 RX ORDER — LEVOTHYROXINE SODIUM 0.05 MG/1
50 TABLET ORAL
Status: DISCONTINUED | OUTPATIENT
Start: 2019-01-23 | End: 2019-01-23

## 2019-01-23 RX ORDER — ACETAMINOPHEN 500 MG
500 TABLET ORAL EVERY 6 HOURS PRN
Status: DISCONTINUED | OUTPATIENT
Start: 2019-01-23 | End: 2019-01-25

## 2019-01-23 RX ORDER — ACETAMINOPHEN 500 MG
1000 TABLET ORAL EVERY 6 HOURS PRN
Status: DISCONTINUED | OUTPATIENT
Start: 2019-01-23 | End: 2019-01-25

## 2019-01-23 RX ORDER — INDOMETHACIN 50 MG/1
100 CAPSULE ORAL ONCE
Status: COMPLETED | OUTPATIENT
Start: 2019-01-23 | End: 2019-01-23

## 2019-01-23 RX ORDER — DOCUSATE SODIUM 100 MG/1
100 CAPSULE, LIQUID FILLED ORAL 2 TIMES DAILY
Status: DISCONTINUED | OUTPATIENT
Start: 2019-01-23 | End: 2019-01-25

## 2019-01-23 RX ORDER — ZOLPIDEM TARTRATE 5 MG/1
5 TABLET ORAL NIGHTLY PRN
Status: DISCONTINUED | OUTPATIENT
Start: 2019-01-23 | End: 2019-01-25

## 2019-01-23 RX ORDER — LEVOTHYROXINE SODIUM 0.05 MG/1
50 TABLET ORAL NIGHTLY
Status: DISCONTINUED | OUTPATIENT
Start: 2019-01-23 | End: 2019-01-25

## 2019-01-23 RX ORDER — CHOLECALCIFEROL (VITAMIN D3) 25 MCG
1 TABLET,CHEWABLE ORAL DAILY
Status: DISCONTINUED | OUTPATIENT
Start: 2019-01-23 | End: 2019-01-23

## 2019-01-23 RX ORDER — CHOLECALCIFEROL (VITAMIN D3) 25 MCG
1 TABLET,CHEWABLE ORAL DAILY
Status: DISCONTINUED | OUTPATIENT
Start: 2019-01-23 | End: 2019-01-25

## 2019-01-23 RX ORDER — DOCUSATE SODIUM 100 MG/1
100 CAPSULE, LIQUID FILLED ORAL 2 TIMES DAILY
Status: DISCONTINUED | OUTPATIENT
Start: 2019-01-23 | End: 2019-01-23

## 2019-01-23 RX ORDER — INDOMETHACIN 50 MG/1
CAPSULE ORAL
Status: COMPLETED
Start: 2019-01-23 | End: 2019-01-23

## 2019-01-23 RX ORDER — CALCIUM CARBONATE 200(500)MG
1000 TABLET,CHEWABLE ORAL
Status: DISCONTINUED | OUTPATIENT
Start: 2019-01-23 | End: 2019-01-25

## 2019-01-23 RX ORDER — ENOXAPARIN SODIUM 100 MG/ML
40 INJECTION SUBCUTANEOUS DAILY
Status: DISCONTINUED | OUTPATIENT
Start: 2019-01-23 | End: 2019-01-24

## 2019-01-23 RX ORDER — BETAMETHASONE SODIUM PHOSPHATE AND BETAMETHASONE ACETATE 3; 3 MG/ML; MG/ML
12 INJECTION, SUSPENSION INTRA-ARTICULAR; INTRALESIONAL; INTRAMUSCULAR; SOFT TISSUE EVERY 24 HOURS
Status: COMPLETED | OUTPATIENT
Start: 2019-01-23 | End: 2019-01-24

## 2019-01-23 RX ORDER — INDOMETHACIN 50 MG/1
50 CAPSULE ORAL EVERY 6 HOURS
Status: COMPLETED | OUTPATIENT
Start: 2019-01-23 | End: 2019-01-25

## 2019-01-23 NOTE — CONSULTS
[facility]  Maternal-Fetal Medicine Inpatient Consultation    Date of Admission:  1/22/2019  Date of Consult:  1/23/2019    Reason for Consult:   H/o rescue cerclage in twins, abdominal pain    History of Present Illness:  Rizwana Fleming is a a( or use drugs.     Allergies:    Dog Dander [Dander]     RASH  Pollen                  RASH    Medications:    Current Facility-Administered Medications:   •  docusate sodium (COLACE) cap 100 mg, 100 mg, Oral, BID  •  Progesterone Micronized (PROMETRIUM) cap associated with prematurity at various gestational ages. The signs and symptoms of  labor were discussed. We talked about potential risks and benefits associated with tocolytic therapy and  steroid.      Debra Ellison  labor actually give birth at term. Interventions to reduce the likelihood of delivery should be reserved for women with  labor at a gestational age at which a delay in delivery will provide benefit to the .  Because tocolytic thera contraindicated when the maternal and fetal risks of prolonging pregnancy or the risks associated with these drugs are greater than the risks associated with  birth.     Common contraindications include:  · Intrauterine fetal demise  · Lethal fetal a (RR, 0.54; 95% CI, 0.43–0.69), necrotizing enterocolitis (RR, 0.46; 95% CI, 0.29–0.74), and death (RR, 0.69; 95% CI, 0.58–0.81), compared with neonates whose mothers did not receive  corticosteroids.     A single repeat course of  corticos Therapy  Tocolytic therapy may provide short-term prolongation of pregnancy, enabling the administration of  corticosteroids and magnesium sulfate for neuroprotection, as well as transport, if indicated, to a tertiary facility.  However, no evidenc should not be used to prolong gestation or improve  outcomes in women with  labor and intact membranes.  This recommendation is distinct from recommendations for antibiotic use for pre-term premature rupture of membranes and group B streptoco remove the stitch could lead to contractions or rupture of membranes. She asked about placement of a another cerclage and I told her at this gestational age that is not advised as the risk of causing harm is greater than the benefit of the surgery.     She

## 2019-01-23 NOTE — PROGRESS NOTES
Pt  To triage with c/o cramping for the last 2 hours, no c/o bleeding or srom.  Monitor applied to pt

## 2019-01-23 NOTE — H&P
[facility] Patient Status:  Inpatient    1990 MRN [id number]   Location [address] [facility] Attending Cathy Guillen MD   Hosp Day # 1 PCP Kelsea Romero MD     SUBJECTIVE:    Chandra Car is a use: No      Home Meds:   Medications Prior to Admission:  Enoxaparin Sodium 40 MG/0.4ML Subcutaneous Solution Inject 0.4 mL (40 mg total) into the skin daily.  Disp: 30 Syringe Rfl: 4 1/22/2019 at 0900   Progesterone Micronized (PROMETRIUM) 200 MG Oral Cap

## 2019-01-23 NOTE — IMAGING NOTE
History: Age: 33 years. : 3 Para: 2. Previous pregnancies: Children born : 2.  Living children: 2.   Obstetric History:   Details on previous pregnancies: Living children <37W: 2.  ______________________________________________________________ presentation: transverse. Placenta: posterior. Intertwin EFW discordance: 13.8 %. Fetal Anatomy:  Visualized with normal appearance: head, gastrointestinal tract, bladder. Brain: Visualized and normal appearance: brain parenchyma.   Spine: appear

## 2019-01-24 RX ORDER — SODIUM CHLORIDE, SODIUM LACTATE, POTASSIUM CHLORIDE, CALCIUM CHLORIDE 600; 310; 30; 20 MG/100ML; MG/100ML; MG/100ML; MG/100ML
INJECTION, SOLUTION INTRAVENOUS CONTINUOUS
Status: DISCONTINUED | OUTPATIENT
Start: 2019-01-24 | End: 2019-01-25

## 2019-01-24 RX ORDER — CEFAZOLIN SODIUM/WATER 2 G/20 ML
SYRINGE (ML) INTRAVENOUS
Status: DISPENSED
Start: 2019-01-24 | End: 2019-01-24

## 2019-01-24 RX ORDER — CALCIUM GLUCONATE 94 MG/ML
1 INJECTION, SOLUTION INTRAVENOUS ONCE AS NEEDED
Status: DISCONTINUED | OUTPATIENT
Start: 2019-01-24 | End: 2019-01-25

## 2019-01-24 RX ORDER — TRISODIUM CITRATE DIHYDRATE AND CITRIC ACID MONOHYDRATE 500; 334 MG/5ML; MG/5ML
SOLUTION ORAL
Status: DISPENSED
Start: 2019-01-24 | End: 2019-01-24

## 2019-01-24 NOTE — CONSULTS
[facility]    Maternal Fetal Medicine Progress Note    Elvis Canales Patient Status:  Inpatient    1990 MRN [id number]   Location [address] [facility] Attending Nahum Reynolds MD   Hosp Day # 2 PCP Xavier Juares MD     SUBJECTIVE:

## 2019-01-24 NOTE — CONSULTS
Asked by MFM to meet mother with 25 wks twins admitted on 2019  because of incompetent cervix. EDC Estimated Date of Delivery: 19. Mother is 33year old y.o.  A positive, rubella immune, RPR NR, HBsAg neg, HIV neg.  Uncomplicated  cour

## 2019-01-24 NOTE — PROGRESS NOTES
MFM progress note -     Called by [facility] RN and Dr. Tasneem Pena due to patient reports of bag of water in the vagina. NO ROM. Ultrasound performed and twin A is cephalic, membranes only in the vagina and twin B is transverse (head in maternal RUQ).     Discussed

## 2019-01-24 NOTE — PROGRESS NOTES
Called to room secondary to BBOW visible at introitus by RN. Patient with no pain. Upon my arrival I observed introitus, and there was no BBOW observed. Speculum done and large BOW observed at +1 station. No fetal presenting part seen. No cervix seen.

## 2019-01-24 NOTE — PROGRESS NOTES
[facility] Patient Status:  Inpatient    1990 MRN [id number]   Location [address] [facility] Attending Gracy Richmond MD   Hosp Day # 2 PCP Lupis Cheatham MD     SUBJECTIVE:    Interval History:

## 2019-01-24 NOTE — PROGRESS NOTES
Report to [facility].  Dr. Juanito Benitez remains at bedside discussing anticipated  care at various gestational ages

## 2019-01-24 NOTE — PROGRESS NOTES
Called to pt room for concerns of pulling out IV while up to bathroom and \"I think I felt the bag of water against my vaginal wall when I was going to the bathroom\". Pt positioned and perineum inspected without bulging BOW noted.  Upon seperating labia sm

## 2019-01-24 NOTE — PROGRESS NOTES
RN to bedside, Introductions made, ID band verified and initial assessment completed. Patient c/o sneezing and feeling some leaking onto her pad, she did not have any additional episodes of leaking and does not continue to leak.  Dr. Anny Richardson notified of mahsa

## 2019-01-25 ENCOUNTER — ANESTHESIA EVENT (OUTPATIENT)
Dept: OBGYN UNIT | Facility: HOSPITAL | Age: 29
End: 2019-01-25
Payer: COMMERCIAL

## 2019-01-25 ENCOUNTER — ANESTHESIA (OUTPATIENT)
Dept: OBGYN UNIT | Facility: HOSPITAL | Age: 29
End: 2019-01-25
Payer: COMMERCIAL

## 2019-01-25 PROBLEM — O30.009 TWIN PREGNANCY (HCC): Status: ACTIVE | Noted: 2019-01-25

## 2019-01-25 PROBLEM — O30.009 TWIN PREGNANCY: Status: ACTIVE | Noted: 2019-01-25

## 2019-01-25 PROCEDURE — 59514 CESAREAN DELIVERY ONLY: CPT | Performed by: OBSTETRICS & GYNECOLOGY

## 2019-01-25 PROCEDURE — 0UCC0ZZ EXTIRPATION OF MATTER FROM CERVIX, OPEN APPROACH: ICD-10-PCS | Performed by: OBSTETRICS & GYNECOLOGY

## 2019-01-25 RX ORDER — SODIUM CHLORIDE, SODIUM LACTATE, POTASSIUM CHLORIDE, CALCIUM CHLORIDE 600; 310; 30; 20 MG/100ML; MG/100ML; MG/100ML; MG/100ML
INJECTION, SOLUTION INTRAVENOUS CONTINUOUS
Status: DISCONTINUED | OUTPATIENT
Start: 2019-01-25 | End: 2019-01-29

## 2019-01-25 RX ORDER — FAMOTIDINE 10 MG/ML
20 INJECTION, SOLUTION INTRAVENOUS ONCE
Status: DISCONTINUED | OUTPATIENT
Start: 2019-01-25 | End: 2019-01-25

## 2019-01-25 RX ORDER — DEXTROSE, SODIUM CHLORIDE, SODIUM LACTATE, POTASSIUM CHLORIDE, AND CALCIUM CHLORIDE 5; .6; .31; .03; .02 G/100ML; G/100ML; G/100ML; G/100ML; G/100ML
INJECTION, SOLUTION INTRAVENOUS CONTINUOUS
Status: DISCONTINUED | OUTPATIENT
Start: 2019-01-25 | End: 2019-01-29

## 2019-01-25 RX ORDER — CEFAZOLIN SODIUM 1 G/3ML
INJECTION, POWDER, FOR SOLUTION INTRAMUSCULAR; INTRAVENOUS
Status: DISCONTINUED | OUTPATIENT
Start: 2019-01-25 | End: 2019-01-25

## 2019-01-25 RX ORDER — LEVOTHYROXINE SODIUM 0.05 MG/1
50 TABLET ORAL
Status: DISCONTINUED | OUTPATIENT
Start: 2019-01-26 | End: 2019-01-29

## 2019-01-25 RX ORDER — METOCLOPRAMIDE 10 MG/1
10 TABLET ORAL ONCE
Status: DISCONTINUED | OUTPATIENT
Start: 2019-01-25 | End: 2019-01-25

## 2019-01-25 RX ORDER — NALOXONE HYDROCHLORIDE 0.4 MG/ML
0.08 INJECTION, SOLUTION INTRAMUSCULAR; INTRAVENOUS; SUBCUTANEOUS
Status: DISCONTINUED | OUTPATIENT
Start: 2019-01-25 | End: 2019-01-29

## 2019-01-25 RX ORDER — HYDROCODONE BITARTRATE AND ACETAMINOPHEN 5; 325 MG/1; MG/1
1 TABLET ORAL EVERY 4 HOURS PRN
Status: DISCONTINUED | OUTPATIENT
Start: 2019-01-25 | End: 2019-01-29

## 2019-01-25 RX ORDER — HYDROMORPHONE HYDROCHLORIDE 1 MG/ML
INJECTION, SOLUTION INTRAMUSCULAR; INTRAVENOUS; SUBCUTANEOUS
Status: DISPENSED
Start: 2019-01-25 | End: 2019-01-26

## 2019-01-25 RX ORDER — ONDANSETRON 2 MG/ML
4 INJECTION INTRAMUSCULAR; INTRAVENOUS ONCE AS NEEDED
Status: DISCONTINUED | OUTPATIENT
Start: 2019-01-25 | End: 2019-01-25 | Stop reason: HOSPADM

## 2019-01-25 RX ORDER — HYDROCODONE BITARTRATE AND ACETAMINOPHEN 10; 325 MG/1; MG/1
1 TABLET ORAL EVERY 4 HOURS PRN
Status: DISCONTINUED | OUTPATIENT
Start: 2019-01-25 | End: 2019-01-29

## 2019-01-25 RX ORDER — ZOLPIDEM TARTRATE 5 MG/1
5 TABLET ORAL NIGHTLY PRN
Status: DISCONTINUED | OUTPATIENT
Start: 2019-01-25 | End: 2019-01-29

## 2019-01-25 RX ORDER — METOCLOPRAMIDE HYDROCHLORIDE 5 MG/ML
10 INJECTION INTRAMUSCULAR; INTRAVENOUS ONCE
Status: DISCONTINUED | OUTPATIENT
Start: 2019-01-25 | End: 2019-01-25

## 2019-01-25 RX ORDER — NALBUPHINE HCL 10 MG/ML
2.5 AMPUL (ML) INJECTION EVERY 4 HOURS PRN
Status: DISCONTINUED | OUTPATIENT
Start: 2019-01-25 | End: 2019-01-29

## 2019-01-25 RX ORDER — HYDROMORPHONE HYDROCHLORIDE 1 MG/ML
0.4 INJECTION, SOLUTION INTRAMUSCULAR; INTRAVENOUS; SUBCUTANEOUS EVERY 5 MIN PRN
Status: DISCONTINUED | OUTPATIENT
Start: 2019-01-25 | End: 2019-01-25 | Stop reason: HOSPADM

## 2019-01-25 RX ORDER — TRISODIUM CITRATE DIHYDRATE AND CITRIC ACID MONOHYDRATE 500; 334 MG/5ML; MG/5ML
30 SOLUTION ORAL ONCE
Status: DISCONTINUED | OUTPATIENT
Start: 2019-01-25 | End: 2019-01-25

## 2019-01-25 RX ORDER — DIPHENHYDRAMINE HYDROCHLORIDE 50 MG/ML
12.5 INJECTION INTRAMUSCULAR; INTRAVENOUS EVERY 4 HOURS PRN
Status: DISCONTINUED | OUTPATIENT
Start: 2019-01-25 | End: 2019-01-29

## 2019-01-25 RX ORDER — KETOROLAC TROMETHAMINE 30 MG/ML
30 INJECTION, SOLUTION INTRAMUSCULAR; INTRAVENOUS ONCE AS NEEDED
Status: DISCONTINUED | OUTPATIENT
Start: 2019-01-25 | End: 2019-01-25 | Stop reason: HOSPADM

## 2019-01-25 RX ORDER — HYDROMORPHONE HYDROCHLORIDE 1 MG/ML
0.4 INJECTION, SOLUTION INTRAMUSCULAR; INTRAVENOUS; SUBCUTANEOUS EVERY 30 MIN PRN
Status: DISCONTINUED | OUTPATIENT
Start: 2019-01-25 | End: 2019-01-26

## 2019-01-25 RX ORDER — CEFAZOLIN SODIUM/WATER 2 G/20 ML
SYRINGE (ML) INTRAVENOUS
Status: DISCONTINUED
Start: 2019-01-25 | End: 2019-01-25 | Stop reason: WASHOUT

## 2019-01-25 RX ORDER — NALBUPHINE HCL 10 MG/ML
2.5 AMPUL (ML) INJECTION
Status: DISCONTINUED | OUTPATIENT
Start: 2019-01-25 | End: 2019-01-25 | Stop reason: HOSPADM

## 2019-01-25 RX ORDER — TRISODIUM CITRATE DIHYDRATE AND CITRIC ACID MONOHYDRATE 500; 334 MG/5ML; MG/5ML
SOLUTION ORAL
Status: DISPENSED
Start: 2019-01-25 | End: 2019-01-26

## 2019-01-25 RX ORDER — ONDANSETRON 2 MG/ML
4 INJECTION INTRAMUSCULAR; INTRAVENOUS EVERY 6 HOURS PRN
Status: DISCONTINUED | OUTPATIENT
Start: 2019-01-25 | End: 2019-01-29

## 2019-01-25 RX ORDER — IBUPROFEN 600 MG/1
600 TABLET ORAL EVERY 6 HOURS SCHEDULED
Status: DISCONTINUED | OUTPATIENT
Start: 2019-01-26 | End: 2019-01-29

## 2019-01-25 RX ORDER — FAMOTIDINE 20 MG/1
20 TABLET ORAL ONCE
Status: DISCONTINUED | OUTPATIENT
Start: 2019-01-25 | End: 2019-01-25

## 2019-01-25 RX ORDER — DOCUSATE SODIUM 100 MG/1
100 CAPSULE, LIQUID FILLED ORAL
Status: DISCONTINUED | OUTPATIENT
Start: 2019-01-26 | End: 2019-01-29

## 2019-01-25 RX ORDER — SIMETHICONE 80 MG
80 TABLET,CHEWABLE ORAL 3 TIMES DAILY PRN
Status: DISCONTINUED | OUTPATIENT
Start: 2019-01-25 | End: 2019-01-29

## 2019-01-25 RX ORDER — ENOXAPARIN SODIUM 100 MG/ML
40 INJECTION SUBCUTANEOUS DAILY
Status: DISCONTINUED | OUTPATIENT
Start: 2019-01-26 | End: 2019-01-25

## 2019-01-25 RX ORDER — DIPHENHYDRAMINE HYDROCHLORIDE 50 MG/ML
25 INJECTION INTRAMUSCULAR; INTRAVENOUS ONCE AS NEEDED
Status: DISCONTINUED | OUTPATIENT
Start: 2019-01-25 | End: 2019-01-25 | Stop reason: HOSPADM

## 2019-01-25 RX ORDER — BISACODYL 10 MG
10 SUPPOSITORY, RECTAL RECTAL
Status: DISCONTINUED | OUTPATIENT
Start: 2019-01-25 | End: 2019-01-29

## 2019-01-25 NOTE — PROGRESS NOTES
Dr Lenny Good called per this RN. This RN informs her pt c/o of gushing x2. This RN informs her that pt felt urge to void prior to gush. FHTs reviewed with Dr Lenny Good. Baby A having variables to 46s and 60s with ctxs.  PT has had 4 ctxs since monitors adj in las

## 2019-01-25 NOTE — PROGRESS NOTES
S: Saw her earlier today and she felt stable/ no contractions no LOF. Just now she complained of feeling like she had to pee and a gush of fluid came out.     O:   01/25/19  0900   BP: 110/59   Pulse: 91   Resp: 17   Temp:     mod lupe, no decels, 150

## 2019-01-25 NOTE — PROGRESS NOTES
PT calls this RN in room. Pt states \" I either just peed myself or my water just broke\". Pt states she felt like she needed to void and she \"let go and something came out\". Pt states it was a large gush. Chux pad and sheet noted to be wet under pt.  Pt'

## 2019-01-25 NOTE — CONSULTS
[facility]    Maternal Fetal Medicine Progress Note    Karena Fermin Patient Status:  Inpatient    1990 MRN [id number]   Location [address] [facility] Attending Seth Bello MD   Hosp Day # 3 PCP Yenny Schultz MD     SUBJECTIVE: · Complete betamethasone course as prescribed  · Complete the Indocin course today  · Continue inpatient bedrest  · Continue MgSO4 through the AM.  · Continue latency antibiotics in light of bulging bag of water    Questions/concerns were discussed with

## 2019-01-25 NOTE — PROGRESS NOTES
Pt calls this RN and states she just had another gush of fluid. Pt states she thinks she peed again. Small amt of fluid noted on pt's legs. PT states she felt the urge to pee and then had the gush of fluid. Catheter adjusted, 100mls clear urine drained.  Huyen Davies

## 2019-01-25 NOTE — PAYOR COMM NOTE
--------------  CONTINUED STAY REVIEW    Payor: [employer] P[employer]  Subscriber #:  [id number]  Authorization Number: [id number]    Admit date: 1/22/19  Admit time: 2328    Admitting Physician: Gracy Richmond MD  Attending Physician:  Gracy Richmond cerclage. - BBOW  · Advanced cervical dilatation  · Maternal obesity complicating pregnancy     RECOMENDATIONS:   · Complete betamethasone course as prescribed  · Complete the Indocin course today  · Continue inpatient bedrest  · Continue MgSO4 through the pregnancy     RECOMMENDATIONS:   1. Inpatient management until delivery  2. Indocin therapy for 48 hours  3. Continue daily vaginal progesterone  4. Begin a course of  corticosteroids (betamethasone)  5.   Daily enoxaparin due to bedrest     Th

## 2019-01-25 NOTE — PAYOR COMM NOTE
--------------  ADMISSION REVIEW     Payor: [address] PPO  Subscriber #:  [id number]  Authorization Number: [id number]    Admit date: 1/22/19  Admit time: 2328       Admitting Physician: Sravani Pete MD  Attending Physician:  Sravani Pete ectopic pregnancy     Family History:   Family History   Problem Relation Age of Onset   • Diabetes Father    • High Blood Pressure Father    • Heart Disease Father    • Heart Surgery Father    • High Blood Pressure Mother    • No Known Problems Maternal G lovenox   -- continuous toco   -- will remain inpatient  3. Di-di twins -- FHT BID  4. IVF -- had fetal echos but need repeated after delivery  5.  Hypothyroid -- continue home meds    Electronically signed by: Buddy Ansari 1/23/2019 9:45 AM        Electro infusion     Date Action Dose Route User    1/24/2019 1215 Bolus from Bag 6 g Intravenous Ngozi Burgos RN        IMPRESSION:   1. Twin IUP at 24w6d  2. Incompetent cervix, dilation and funneling past her cerclage  3. Di/di twins  4.   Cephalic/ trans

## 2019-01-25 NOTE — PROGRESS NOTES
This RN at bedside. FHTs not tracing due to pt position. Pericare done, pt gown changed. Guerrero catheter changed as ordered per Dr Ninoska Hoff. This RN will readjust EFMs .

## 2019-01-26 ENCOUNTER — APPOINTMENT (OUTPATIENT)
Dept: GENERAL RADIOLOGY | Facility: HOSPITAL | Age: 29
End: 2019-01-26
Attending: OBSTETRICS & GYNECOLOGY
Payer: COMMERCIAL

## 2019-01-26 LAB
ALBUMIN SERPL-MCNC: 2.3 G/DL (ref 3.1–4.5)
ALBUMIN/GLOB SERPL: 0.6 {RATIO} (ref 1–2)
ALP LIVER SERPL-CCNC: 63 U/L (ref 37–98)
ALT SERPL-CCNC: 14 U/L (ref 14–54)
ANION GAP SERPL CALC-SCNC: 9 MMOL/L (ref 0–18)
AST SERPL-CCNC: 22 U/L (ref 15–41)
BASOPHILS # BLD AUTO: 0.01 X10(3) UL (ref 0–0.1)
BASOPHILS # BLD AUTO: 0.03 X10(3) UL (ref 0–0.1)
BASOPHILS NFR BLD AUTO: 0.1 %
BASOPHILS NFR BLD AUTO: 0.3 %
BILIRUB SERPL-MCNC: 0.6 MG/DL (ref 0.1–2)
BUN BLD-MCNC: 5 MG/DL (ref 8–20)
BUN/CREAT SERPL: 7.9 (ref 10–20)
CALCIUM BLD-MCNC: 8.6 MG/DL (ref 8.3–10.3)
CHLORIDE SERPL-SCNC: 102 MMOL/L (ref 101–111)
CO2 SERPL-SCNC: 25 MMOL/L (ref 22–32)
CREAT BLD-MCNC: 0.63 MG/DL (ref 0.55–1.02)
EOSINOPHIL # BLD AUTO: 0.01 X10(3) UL (ref 0–0.3)
EOSINOPHIL # BLD AUTO: 0.01 X10(3) UL (ref 0–0.3)
EOSINOPHIL NFR BLD AUTO: 0.1 %
EOSINOPHIL NFR BLD AUTO: 0.1 %
ERYTHROCYTE [DISTWIDTH] IN BLOOD BY AUTOMATED COUNT: 14.6 % (ref 11.5–16)
ERYTHROCYTE [DISTWIDTH] IN BLOOD BY AUTOMATED COUNT: 14.6 % (ref 11.5–16)
GLOBULIN PLAS-MCNC: 3.6 G/DL (ref 2.8–4.4)
GLUCOSE BLD-MCNC: 100 MG/DL (ref 70–99)
HCT VFR BLD AUTO: 30.8 % (ref 34–50)
HCT VFR BLD AUTO: 32.2 % (ref 34–50)
HGB BLD-MCNC: 10.4 G/DL (ref 12–16)
HGB BLD-MCNC: 10.9 G/DL (ref 12–16)
IMMATURE GRANULOCYTE COUNT: 0.05 X10(3) UL (ref 0–1)
IMMATURE GRANULOCYTE COUNT: 0.08 X10(3) UL (ref 0–1)
IMMATURE GRANULOCYTE RATIO %: 0.4 %
IMMATURE GRANULOCYTE RATIO %: 0.7 %
LYMPHOCYTES # BLD AUTO: 1.07 X10(3) UL (ref 0.9–4)
LYMPHOCYTES # BLD AUTO: 1.82 X10(3) UL (ref 0.9–4)
LYMPHOCYTES NFR BLD AUTO: 15.5 %
LYMPHOCYTES NFR BLD AUTO: 9.3 %
M PROTEIN MFR SERPL ELPH: 5.9 G/DL (ref 6.4–8.2)
MCH RBC QN AUTO: 28.4 PG (ref 27–33.2)
MCH RBC QN AUTO: 29 PG (ref 27–33.2)
MCHC RBC AUTO-ENTMCNC: 33.8 G/DL (ref 31–37)
MCHC RBC AUTO-ENTMCNC: 33.9 G/DL (ref 31–37)
MCV RBC AUTO: 84.2 FL (ref 81–100)
MCV RBC AUTO: 85.6 FL (ref 81–100)
MONOCYTES # BLD AUTO: 0.77 X10(3) UL (ref 0.1–1)
MONOCYTES # BLD AUTO: 0.78 X10(3) UL (ref 0.1–1)
MONOCYTES NFR BLD AUTO: 6.6 %
MONOCYTES NFR BLD AUTO: 6.7 %
NEUTROPHIL ABS PRELIM: 9.05 X10 (3) UL (ref 1.3–6.7)
NEUTROPHIL ABS PRELIM: 9.63 X10 (3) UL (ref 1.3–6.7)
NEUTROPHILS # BLD AUTO: 9.05 X10(3) UL (ref 1.3–6.7)
NEUTROPHILS # BLD AUTO: 9.63 X10(3) UL (ref 1.3–6.7)
NEUTROPHILS NFR BLD AUTO: 76.8 %
NEUTROPHILS NFR BLD AUTO: 83.4 %
OSMOLALITY SERPL CALC.SUM OF ELEC: 279 MOSM/KG (ref 275–295)
PLATELET # BLD AUTO: 187 10(3)UL (ref 150–450)
PLATELET # BLD AUTO: 203 10(3)UL (ref 150–450)
POTASSIUM SERPL-SCNC: 3.4 MMOL/L (ref 3.6–5.1)
RBC # BLD AUTO: 3.66 X10(6)UL (ref 3.8–5.1)
RBC # BLD AUTO: 3.76 X10(6)UL (ref 3.8–5.1)
RED CELL DISTRIBUTION WIDTH-SD: 44.9 FL (ref 35.1–46.3)
RED CELL DISTRIBUTION WIDTH-SD: 45.1 FL (ref 35.1–46.3)
SODIUM SERPL-SCNC: 136 MMOL/L (ref 136–144)
WBC # BLD AUTO: 11.5 X10(3) UL (ref 4–13)
WBC # BLD AUTO: 11.8 X10(3) UL (ref 4–13)

## 2019-01-26 PROCEDURE — 71045 X-RAY EXAM CHEST 1 VIEW: CPT | Performed by: OBSTETRICS & GYNECOLOGY

## 2019-01-26 RX ORDER — ACETAMINOPHEN 500 MG
TABLET ORAL
Status: DISCONTINUED
Start: 2019-01-26 | End: 2019-01-26 | Stop reason: WASHOUT

## 2019-01-26 RX ORDER — ACETAMINOPHEN 325 MG/1
650 TABLET ORAL EVERY 6 HOURS PRN
Status: DISCONTINUED | OUTPATIENT
Start: 2019-01-26 | End: 2019-01-29

## 2019-01-26 RX ORDER — IBUPROFEN 600 MG/1
600 TABLET ORAL EVERY 6 HOURS PRN
Status: DISCONTINUED | OUTPATIENT
Start: 2019-01-26 | End: 2019-01-29

## 2019-01-26 RX ORDER — ACETAMINOPHEN 325 MG/1
TABLET ORAL
Status: DISPENSED
Start: 2019-01-26 | End: 2019-01-27

## 2019-01-26 RX ORDER — HYDROMORPHONE HYDROCHLORIDE 1 MG/ML
0.2 INJECTION, SOLUTION INTRAMUSCULAR; INTRAVENOUS; SUBCUTANEOUS EVERY 30 MIN PRN
Status: DISCONTINUED | OUTPATIENT
Start: 2019-01-26 | End: 2019-01-29

## 2019-01-26 NOTE — PROGRESS NOTES
Patient taken to NICU to see babies via cart. Pt transferred to mother/baby room 2198 per cart in stable condition with baby and personal belongings. Accompanied by  and staff. Report given to mother/baby RN.

## 2019-01-26 NOTE — BRIEF OP NOTE
Pre-Operative Diagnosis: [id number] week twin IUP - cervical incompetence - PPROM - deep variable decels on Baby A     Post-Operative Diagnosis: same     Procedure Performed: Primary LTCS and removal of cerclage  Procedure(s):      Surgeon(s) and Role:

## 2019-01-26 NOTE — PROGRESS NOTES
Assisted up to the bathroom and camila care given. Returned to room and sitting in chair. Respiratory here to instruct on   Is.

## 2019-01-26 NOTE — ANESTHESIA PREPROCEDURE EVALUATION
PRE-OP EVALUATION    Patient Name: Toan Payor    Pre-op Diagnosis: * No pre-op diagnosis entered *    * No procedures listed *    * No surgeons found in log *    Pre-op vitals reviewed.   Temp: 97.8 °F (36.6 °C)  Pulse: 110  Resp: 18  BP: 132/62  SpO2: 95 acetaminophen (TYLENOL EXTRA STRENGTH) tab 1,000 mg 1,000 mg Oral Q6H PRN   Zolpidem Tartrate (AMBIEN) tab 5 mg 5 mg Oral Nightly PRN   docusate sodium (COLACE) cap 100 mg 100 mg Oral BID   Calcium Carbonate Antacid (TUMS) chewable tab 1,000 mg 1,000 mg Never Used    Alcohol use: No      Drug use: No     Available pre-op labs reviewed.   Lab Results   Component Value Date    WBC 10.2 01/23/2019    RBC 4.61 01/23/2019    HGB 13.3 01/23/2019    HCT 39.2 01/23/2019    MCV 85.0 01/23/2019    MCH 28.9 01/23/201

## 2019-01-26 NOTE — PLAN OF CARE
GASTROINTESTINAL - ADULT    • Minimal or absence of nausea and vomiting Progressing        GENITOURINARY - ADULT    • Absence of urinary retention Progressing        RESPIRATORY - ADULT    • Achieves optimal ventilation and oxygenation Progressing        S

## 2019-01-26 NOTE — ANESTHESIA POSTPROCEDURE EVALUATION
[address] [facility] Patient Status:  Inpatient   Age/Gender 33year old female MRN [id number]   Location [facility] Attending Silvano Simmons MD   [facility] Day # 3 PCP Ana Castaneda MD       Anesthesia Post-op Note    Procedu

## 2019-01-26 NOTE — OPERATIVE REPORT
[address] [facility]    PATIENT'S NAME: Angela Kauffman   ATTENDING PHYSICIAN: Derek Lombardi M.D. OPERATING PHYSICIAN: Tulio Greco M.D.    PATIENT ACCOUNT#:   [de-identified]    LOCATION:  [address] [place] [address]  MEDICAL RECORD #:   [id number]       DATE OF BIRTH:  09/2 baby's head. These were ruptured and the nares and mouth were suctioned. There was no nuchal cord. The rest of the infant delivered easily. Vigorous live-born female who gave cry while on the abdomen.   Went after the second baby, which was cephalic pre

## 2019-01-26 NOTE — PROGRESS NOTES
POD#1  /82 (BP Location: Right arm)   Pulse (!) 125   Temp (!) 100.9 °F (38.3 °C) (Oral)   Resp 24   Ht 5' 9\" (1.753 m)   Wt 290 lb (131.5 kg)   LMP 04/28/2018   SpO2 93%   Breastfeeding?  Unknown   BMI 42.83 kg/m²    Pt without complaints - sleepy w

## 2019-01-26 NOTE — PROGRESS NOTES
NURSING ADMISSION NOTE      Patient admitted via Cart  Oriented to room. Safety precautions initiated. Bed in low position. Call light in reach. Assessment completed. POC discussed with pt and spouse. Both verbalized undestanding of POC.

## 2019-01-26 NOTE — PROGRESS NOTES
Called to see pt - deep variable decels on Baby A   VE - no bag palpable in the vagina - leaking yellow stained clear fluid from the vagina - highly suspicious for PPROM  D/W Dr. Meet Young - with the deep variables - he agrees with proceeding with delivery -

## 2019-01-27 LAB
BASOPHILS # BLD AUTO: 0.01 X10(3) UL (ref 0–0.1)
BASOPHILS NFR BLD AUTO: 0.1 %
EOSINOPHIL # BLD AUTO: 0 X10(3) UL (ref 0–0.3)
EOSINOPHIL NFR BLD AUTO: 0 %
ERYTHROCYTE [DISTWIDTH] IN BLOOD BY AUTOMATED COUNT: 14.5 % (ref 11.5–16)
HCT VFR BLD AUTO: 31.8 % (ref 34–50)
HGB BLD-MCNC: 10.9 G/DL (ref 12–16)
IMMATURE GRANULOCYTE COUNT: 0.08 X10(3) UL (ref 0–1)
IMMATURE GRANULOCYTE RATIO %: 0.8 %
LYMPHOCYTES # BLD AUTO: 0.74 X10(3) UL (ref 0.9–4)
LYMPHOCYTES NFR BLD AUTO: 7.7 %
MCH RBC QN AUTO: 29.1 PG (ref 27–33.2)
MCHC RBC AUTO-ENTMCNC: 34.3 G/DL (ref 31–37)
MCV RBC AUTO: 84.8 FL (ref 81–100)
MONOCYTES # BLD AUTO: 0.64 X10(3) UL (ref 0.1–1)
MONOCYTES NFR BLD AUTO: 6.7 %
NEUTROPHIL ABS PRELIM: 8.15 X10 (3) UL (ref 1.3–6.7)
NEUTROPHILS # BLD AUTO: 8.15 X10(3) UL (ref 1.3–6.7)
NEUTROPHILS NFR BLD AUTO: 84.7 %
PLATELET # BLD AUTO: 177 10(3)UL (ref 150–450)
RBC # BLD AUTO: 3.75 X10(6)UL (ref 3.8–5.1)
RED CELL DISTRIBUTION WIDTH-SD: 44.6 FL (ref 35.1–46.3)
WBC # BLD AUTO: 9.6 X10(3) UL (ref 4–13)

## 2019-01-27 RX ORDER — HYDROCODONE BITARTRATE AND ACETAMINOPHEN 10; 325 MG/1; MG/1
1 TABLET ORAL EVERY 6 HOURS PRN
Status: DISCONTINUED | OUTPATIENT
Start: 2019-01-27 | End: 2019-01-29

## 2019-01-27 NOTE — PLAN OF CARE
COPING    • Pt/Family able to verbalize concerns and demonstrate effective coping strategies Progressing        GASTROINTESTINAL - ADULT    • Minimal or absence of nausea and vomiting Progressing        GENITOURINARY - ADULT    • Absence of urinary retenti

## 2019-01-27 NOTE — PROGRESS NOTES
Patient doing well, oxygen off  Taking good deep breaths and  Up and voiding well. Pumped with lactation.

## 2019-01-27 NOTE — PROGRESS NOTES
POD #2  Pt states feeling better this morning - feels like the temp is breaking - had a cough - no cough prior to admission  /66 (BP Location: Right arm)   Pulse (!) 134   Temp (!) 100.7 °F (38.2 °C) (Oral)   Resp 20   Ht 5' 9\" (1.753 m)   Wt 290 lb

## 2019-01-27 NOTE — PROGRESS NOTES
PCA pump disconnected and patient given Norco for pain. Guerrero removed and patient will call to get up to bathroom.

## 2019-01-27 NOTE — PROGRESS NOTES
RN given report that the temperature for patient in 2198 is now  102. 1. Dr. Ian Hart called and new antibiotic order will be started. OB spoke with Infectious disease dr and he will be up in morning to check out patient. Stat CBC ordered and drawn as well as

## 2019-01-27 NOTE — PROGRESS NOTES
Patient still running temp, increasing. Now at 101. 6. Dr. Alyssa Dorado phoned by RN and orders given. Blood culture x2 and urine culture needed. Given Tylenol 650 po. Patient aware of all conversations with the Dr. Siena Montague that lab will be coming.  Pulse rate contin

## 2019-01-27 NOTE — CONSULTS
INFECTIOUS DISEASE CONSULTATION    Lucinda Gilbert Patient Status:  Inpatient    1990 MRN [id number]   [facility] 2SW-J Attending Rosendo Lewis MD   Hosp Day # 5 PCP Joo Stewart MD Intravenous, Q8H  •  guaiFENesin (ROBITUSSIN) 100mg/5ml LIQUID 50 mg, 50 mg, Oral, Q4H PRN  •  acetaminophen (TYLENOL) tab 650 mg, 650 mg, Oral, Q6H PRN  •  ibuprofen (MOTRIN) tab 600 mg, 600 mg, Oral, Q6H PRN  •  HYDROmorphone HCl (DILAUDID) 1 MG/ML injec 4   Progesterone Micronized (PROMETRIUM) 200 MG Oral Cap Place 1 capsule (200 mg total) vaginally nightly. Disp: 30 capsule Rfl: 4   prenatal multivitamin plus DHA 27-0.8-228 MG Oral Cap Take 1 capsule by mouth daily.  Disp:  Rfl:    Levothyroxine Sodium 50 contamination. Urine Culture, Routine Once [[id number]] Collected: 01/26/19 1625   Order Status: Sent Lab Status:  In process Updated: 01/26/19 1639   Specimen: Urine, clean catch    Blood Culture FREQ X 2 [[id number]] Collected: 01/26/19 1652   Order S

## 2019-01-27 NOTE — PROGRESS NOTES
Patient stated that the Norco help her relax. Continues to pass gas. Has been up to void and did well.

## 2019-01-27 NOTE — PROGRESS NOTES
Patient kaufman removed and back to bed. PCA discontined and Norco 10 given. Patient has eaten some food.

## 2019-01-27 NOTE — PROGRESS NOTES
Patient assisted up to the bathroom and sitting on toilet to have a bowel movement. Gait steady and slow. Patient had small bm and passed lots of gas. Washed up at sink and assisted back to room.  Sitting in chair, encouraged to try to eat so  Norco po can

## 2019-01-28 NOTE — PROGRESS NOTES
[facility]                INFECTIOUS DISEASE PROGRESS NOTE    Leandro Valencia Patient Status:  Inpatient    1990 MRN [id number]   [facility] 2SW-J Attending Yeison Lozano MD   Hosp Day # 6 PCP Jalen Sherman MD     Subjective Threatened miscarriage     Ovarian hyperstimulation syndrome     Incompetent cervix in pregnancy, antepartum, second trimester     Twin pregnancy, antepartum     Pregnancy resulting from assisted reproductive technology, second trimester     Pregnancy

## 2019-01-28 NOTE — PROGRESS NOTES
Moms temps since early yesterday am recorded and shown to nicu RN, afebrile, and Fer Will RN states that mom may visit babies, she will wait til husb returns and go then.

## 2019-01-28 NOTE — OPERATIVE REPORT
[facility]    PATIENT'S NAME: Vinita Warren   ATTENDING PHYSICIAN: David Cristina M.D. OPERATING PHYSICIAN: Sneha Petersen M.D.    PATIENT ACCOUNT#:   [de-identified]    LOCATION:  [address] [place] [address]  MEDICAL RECORD #:   [id number]       DATE OF BIRTH:  09/2 membranes were really stuck onto the baby's head. These were ruptured and the nares and mouth were suctioned. There was no nuchal cord. The rest of the infant delivered easily. Vigorous live-born female who gave cry while on the abdomen.   Went after th Nicolas Douglas M.D.  d: 01/25/2019 20:02:02  t: 01/28/2019 08:17:44  [facility] [id number]  JRJ/

## 2019-01-28 NOTE — PROGRESS NOTES
[facility]  Post-Partum Caesarean Section Progress Note    Leandro Valencia Patient Status:  Inpatient    1990 MRN [id number]   [facility] 2SW-J Attending Yeison Lozano MD   Hosp Day # 6 PCP Jalen Sherman MD     SUBJECTIVE:    WEL

## 2019-01-28 NOTE — PAYOR COMM NOTE
--------------  CONTINUED STAY REVIEW    Payor: [employer] P[employer]  Subscriber #:  [id number]  Authorization Number: [id number]    Admit date: 1/22/19  Admit time: 2328    Admitting Physician: José Graham MD  Attending Physician:  José Graham, hrs. N/A                     Problem list reviewed:  Patient Active Problem List:     Iron deficiency anemia     Threatened miscarriage     Ovarian hyperstimulation syndrome     Incompetent cervix in pregnancy, antepartum, second trimester     Twin pregnan morning     Stable                Electronically signed by Mandy Wilson MD at 1/27/2019  7:10 AM   POD#1  /82 (BP Location: Right arm)   Pulse (!) 125   Temp (!) 100.9 °F (38.3 °C) (Oral)   Resp 24   Ht 5' 9\" (1.753 m)   Wt 290 lb (131.5 kg)   LMP Denson Saint, RN    1/27/2019 4559 Given 1 tablet Oral Sherman Choudhury RN      HYDROcodone-acetaminophen [facility])  MG per tab 1 tablet     Date Action Dose Route User    1/28/2019 1211 Given 1 tablet Oral Anuj Hartley RN    1/27/2019 1812 Given 1 ta

## 2019-01-28 NOTE — PLAN OF CARE
COPING    • Pt/Family able to verbalize concerns and demonstrate effective coping strategies Progressing        POSTPARTUM    • Long Term Goal:Experiences normal postpartum course Progressing    • Optimize infant feeding at the breast Progressing    • Esta

## 2019-01-29 VITALS
HEART RATE: 98 BPM | HEIGHT: 69 IN | RESPIRATION RATE: 18 BRPM | BODY MASS INDEX: 42.95 KG/M2 | DIASTOLIC BLOOD PRESSURE: 77 MMHG | OXYGEN SATURATION: 93 % | TEMPERATURE: 98 F | SYSTOLIC BLOOD PRESSURE: 129 MMHG | WEIGHT: 290 LBS

## 2019-01-29 PROBLEM — Z34.90 PREGNANCY: Status: RESOLVED | Noted: 2019-01-22 | Resolved: 2019-01-29

## 2019-01-29 PROBLEM — O09.812 PREGNANCY RESULTING FROM ASSISTED REPRODUCTIVE TECHNOLOGY, SECOND TRIMESTER (HCC): Status: RESOLVED | Noted: 2019-01-18 | Resolved: 2019-01-29

## 2019-01-29 PROBLEM — O30.009 TWIN PREGNANCY (HCC): Status: RESOLVED | Noted: 2019-01-25 | Resolved: 2019-01-29

## 2019-01-29 PROBLEM — O30.009 TWIN PREGNANCY: Status: RESOLVED | Noted: 2019-01-25 | Resolved: 2019-01-29

## 2019-01-29 PROBLEM — Z34.90 PREGNANCY (HCC): Status: RESOLVED | Noted: 2019-01-22 | Resolved: 2019-01-29

## 2019-01-29 PROBLEM — O09.812 PREGNANCY RESULTING FROM ASSISTED REPRODUCTIVE TECHNOLOGY, SECOND TRIMESTER: Status: RESOLVED | Noted: 2019-01-18 | Resolved: 2019-01-29

## 2019-01-29 RX ORDER — IBUPROFEN 600 MG/1
600 TABLET ORAL EVERY 6 HOURS PRN
Qty: 60 TABLET | Refills: 0 | Status: SHIPPED | OUTPATIENT
Start: 2019-01-29 | End: 2019-03-12

## 2019-01-29 RX ORDER — HYDROCODONE BITARTRATE AND ACETAMINOPHEN 5; 325 MG/1; MG/1
1-2 TABLET ORAL EVERY 4 HOURS PRN
Qty: 20 TABLET | Refills: 0 | Status: SHIPPED | OUTPATIENT
Start: 2019-01-29 | End: 2019-02-25 | Stop reason: ALTCHOICE

## 2019-01-29 NOTE — PLAN OF CARE
COPING    • Pt/Family able to verbalize concerns and demonstrate effective coping strategies Completed        RESPIRATORY - ADULT    • Achieves optimal ventilation and oxygenation Completed          POSTPARTUM    • Long Term Goal:Experiences normal postpar

## 2019-01-29 NOTE — PROGRESS NOTES
OB Progress Note POD#4  S: She is feeling well. Ambulating. Pain controlled. Minimal VB. Eating, passing gas. Breast pumping. Not feeling feverish. O:  Blood pressure 108/64, pulse 78, temperature 97.9 °F (36.6 °C), temperature source Oral, resp.  rate 1

## 2019-01-29 NOTE — PROGRESS NOTES
Pt given all discharge information and pt verbalized understanding to all. Pt is comfortable going home. Pt will schedule appt in 6 weeks. Pt will call MD with any questions.

## 2019-01-29 NOTE — CM/SW NOTE
CM met with patient, Lexy Garland and her  Rubén. We reviewed insurance and PCP. Patient is going to add infant to [facility]. The couple has not selected a PCP yet. Couple stated that they are looking at their insurance web site.  CM provided parents with na

## 2019-01-31 ENCOUNTER — APPOINTMENT (OUTPATIENT)
Dept: LACTATION | Facility: HOSPITAL | Age: 29
End: 2019-01-31
Attending: OBSTETRICS & GYNECOLOGY
Payer: COMMERCIAL

## 2019-02-01 ENCOUNTER — TELEPHONE (OUTPATIENT)
Dept: OBGYN UNIT | Facility: HOSPITAL | Age: 29
End: 2019-02-01

## 2019-02-01 NOTE — PROGRESS NOTES
Reviewed self care w / mom, she verbalizes understanding of instructions reviewed. Encourage to follow up w/ MDs as directed and w/ questions/concerns. Infant girls remains in nicu and mom visits marissa chou.  Emotional support given

## 2019-02-02 ENCOUNTER — APPOINTMENT (OUTPATIENT)
Dept: LACTATION | Facility: HOSPITAL | Age: 29
End: 2019-02-02
Attending: OBSTETRICS & GYNECOLOGY
Payer: COMMERCIAL

## 2019-02-04 NOTE — PAYOR COMM NOTE
--------------REQUESTING ADDITIONAL DAY 1/28 WITH DISCHARGE ON 1/29  PLEASE FAX BACK APPROVED DAYS    DISCHARGE REVIEW    Payor: [employer] P[employer]  Subscriber #:  [id number]  Authorization Number: [id number]    Admit date: 1/22/19  Admit time:  2328  Cathern Sophie Status post  section. Doing well postoperatively. Afebrile, discontinue Abx.   Continue current care.     José Hwang  2019  10:11 AM                   DISCHARGED   Clearance MD Morgan   Physician   OB/Gyn   Progress Notes   Signed

## 2019-02-09 NOTE — DISCHARGE SUMMARY
[facility]    Discharge Summary    [facility] Patient Status:  Inpatient    1990 MRN [id number]   [facility] 2SW-J Attending No att. providers found   Hosp Day # 7 PCP Kathya Styles MD     Primary OB Clinician: Renée Mosqueda

## 2019-02-25 PROCEDURE — 87077 CULTURE AEROBIC IDENTIFY: CPT | Performed by: OBSTETRICS & GYNECOLOGY

## 2019-02-25 PROCEDURE — 87070 CULTURE OTHR SPECIMN AEROBIC: CPT | Performed by: OBSTETRICS & GYNECOLOGY

## 2019-02-25 PROCEDURE — 87205 SMEAR GRAM STAIN: CPT | Performed by: OBSTETRICS & GYNECOLOGY

## 2019-02-25 PROCEDURE — 87076 CULTURE ANAEROBE IDENT EACH: CPT | Performed by: OBSTETRICS & GYNECOLOGY

## 2019-02-25 PROCEDURE — 87075 CULTR BACTERIA EXCEPT BLOOD: CPT | Performed by: OBSTETRICS & GYNECOLOGY

## 2019-12-27 ENCOUNTER — HOSPITAL ENCOUNTER (EMERGENCY)
Facility: HOSPITAL | Age: 29
Discharge: HOME | End: 2019-12-27
Attending: EMERGENCY MEDICINE
Payer: COMMERCIAL

## 2019-12-27 VITALS
DIASTOLIC BLOOD PRESSURE: 64 MMHG | RESPIRATION RATE: 18 BRPM | HEART RATE: 87 BPM | SYSTOLIC BLOOD PRESSURE: 118 MMHG | WEIGHT: 290 LBS | BODY MASS INDEX: 42.95 KG/M2 | TEMPERATURE: 98 F | OXYGEN SATURATION: 100 % | HEIGHT: 69 IN

## 2019-12-27 DIAGNOSIS — N92.1 MENOMETRORRHAGIA: Primary | ICD-10-CM

## 2019-12-27 DIAGNOSIS — D64.9 ANEMIA, UNSPECIFIED TYPE: ICD-10-CM

## 2019-12-27 LAB
ANION GAP SERPL CALC-SCNC: 3 MMOL/L (ref 0–18)
BASOPHILS # BLD AUTO: 0.08 X10(3) UL (ref 0–0.2)
BASOPHILS NFR BLD AUTO: 1 %
BUN BLD-MCNC: 11 MG/DL (ref 7–18)
BUN/CREAT SERPL: 12.4 (ref 10–20)
CALCIUM BLD-MCNC: 8.9 MG/DL (ref 8.5–10.1)
CHLORIDE SERPL-SCNC: 110 MMOL/L (ref 98–112)
CO2 SERPL-SCNC: 29 MMOL/L (ref 21–32)
CREAT BLD-MCNC: 0.89 MG/DL (ref 0.55–1.02)
DEPRECATED RDW RBC AUTO: 44.2 FL (ref 35.1–46.3)
EOSINOPHIL # BLD AUTO: 0.27 X10(3) UL (ref 0–0.7)
EOSINOPHIL NFR BLD AUTO: 3.3 %
ERYTHROCYTE [DISTWIDTH] IN BLOOD BY AUTOMATED COUNT: 15.6 % (ref 11–15)
GLUCOSE BLD-MCNC: 78 MG/DL (ref 70–99)
HCT VFR BLD AUTO: 26.4 % (ref 35–48)
HGB BLD-MCNC: 7.8 G/DL (ref 12–16)
IMM GRANULOCYTES # BLD AUTO: 0.09 X10(3) UL (ref 0–1)
IMM GRANULOCYTES NFR BLD: 1.1 %
LYMPHOCYTES # BLD AUTO: 3.21 X10(3) UL (ref 1–4)
LYMPHOCYTES NFR BLD AUTO: 39 %
MCH RBC QN AUTO: 23.3 PG (ref 26–34)
MCHC RBC AUTO-ENTMCNC: 29.5 G/DL (ref 31–37)
MCV RBC AUTO: 78.8 FL (ref 80–100)
MONOCYTES # BLD AUTO: 0.85 X10(3) UL (ref 0.1–1)
MONOCYTES NFR BLD AUTO: 10.3 %
NEUTROPHILS # BLD AUTO: 3.73 X10 (3) UL (ref 1.5–7.7)
NEUTROPHILS # BLD AUTO: 3.73 X10(3) UL (ref 1.5–7.7)
NEUTROPHILS NFR BLD AUTO: 45.3 %
OSMOLALITY SERPL CALC.SUM OF ELEC: 292 MOSM/KG (ref 275–295)
PLATELET # BLD AUTO: 339 10(3)UL (ref 150–450)
POCT LOT NUMBER: NORMAL
POCT URINE PREGNANCY: NEGATIVE
POTASSIUM SERPL-SCNC: 3.5 MMOL/L (ref 3.5–5.1)
RBC # BLD AUTO: 3.35 X10(6)UL (ref 3.8–5.3)
SODIUM SERPL-SCNC: 142 MMOL/L (ref 136–145)
WBC # BLD AUTO: 8.2 X10(3) UL (ref 4–11)

## 2019-12-27 PROCEDURE — 81025 URINE PREGNANCY TEST: CPT

## 2019-12-27 PROCEDURE — 85025 COMPLETE CBC W/AUTO DIFF WBC: CPT | Performed by: EMERGENCY MEDICINE

## 2019-12-27 PROCEDURE — 99284 EMERGENCY DEPT VISIT MOD MDM: CPT

## 2019-12-27 PROCEDURE — 96360 HYDRATION IV INFUSION INIT: CPT

## 2019-12-27 PROCEDURE — 80048 BASIC METABOLIC PNL TOTAL CA: CPT | Performed by: EMERGENCY MEDICINE

## 2019-12-27 RX ORDER — SODIUM CHLORIDE 9 MG/ML
INJECTION, SOLUTION INTRAVENOUS CONTINUOUS
Status: DISCONTINUED | OUTPATIENT
Start: 2019-12-27 | End: 2019-12-27

## 2019-12-27 RX ORDER — MEDROXYPROGESTERONE ACETATE 10 MG/1
10 TABLET ORAL DAILY
Qty: 7 TABLET | Refills: 0 | Status: SHIPPED | OUTPATIENT
Start: 2019-12-27 | End: 2020-01-04

## 2019-12-27 NOTE — ED NOTES
History of vaginal bleeding for the past 2 months. Past week has been spotting, only using one pad per day, but for the past 45 days was utilizing 4 to 5 pads a day for vaginal bleeding. Complaining of SOB with minimal exertion for the past week.   Kem

## 2019-12-27 NOTE — ED INITIAL ASSESSMENT (HPI)
Pt states \"I have been consistently bleeding for the past couple of months. I have PCOS but this is much longer. It was initially very heavy but it has slowed down. This past week when I walk I have a harder time breathing\".  Pt has previous hx of anemia

## 2019-12-27 NOTE — ED PROVIDER NOTES
Patient Seen in: [facility] Emergency Department      History   Patient presents with:  Eval-G  Cough/URI    Stated Complaint: sob, bleeding    HPI    Patient recalls her last.  To be around the beginning of November.   She states that she has had bl for stated complaint: sob, bleeding  Other systems are as noted in HPI. Constitutional and vital signs reviewed. All other systems reviewed and negative except as noted above.     Physical Exam     ED Triage Vitals [12/27/19 1430]   /59   Pulse -----------         ------                     CBC W/ DIFFERENTIAL[[id number]]          Abnormal            Final result                 Please view results for these tests on the individual orders.    POCT PREGNANCY, URINE   RAINBOW DRAW

## 2020-01-04 ENCOUNTER — OFFICE VISIT (OUTPATIENT)
Dept: FAMILY MEDICINE CLINIC | Facility: CLINIC | Age: 30
End: 2020-01-04
Payer: COMMERCIAL

## 2020-01-04 VITALS
SYSTOLIC BLOOD PRESSURE: 128 MMHG | RESPIRATION RATE: 16 BRPM | HEART RATE: 84 BPM | WEIGHT: 293 LBS | BODY MASS INDEX: 43.9 KG/M2 | TEMPERATURE: 98 F | DIASTOLIC BLOOD PRESSURE: 80 MMHG | HEIGHT: 68.5 IN

## 2020-01-04 DIAGNOSIS — E66.9 OBESITY WITHOUT SERIOUS COMORBIDITY, UNSPECIFIED CLASSIFICATION, UNSPECIFIED OBESITY TYPE: ICD-10-CM

## 2020-01-04 DIAGNOSIS — D50.0 IRON DEFICIENCY ANEMIA DUE TO CHRONIC BLOOD LOSS: ICD-10-CM

## 2020-01-04 DIAGNOSIS — Z00.00 ROUTINE GENERAL MEDICAL EXAMINATION AT A HEALTH CARE FACILITY: Primary | ICD-10-CM

## 2020-01-04 DIAGNOSIS — E03.9 HYPOTHYROIDISM, UNSPECIFIED TYPE: ICD-10-CM

## 2020-01-04 PROCEDURE — 99385 PREV VISIT NEW AGE 18-39: CPT | Performed by: FAMILY MEDICINE

## 2020-01-04 NOTE — PROGRESS NOTES
Leandro Valencia is a 30year old female and is new to our office. Patient presents today to establish care. The patient's history includes:  · Anemia:  Was in the ER recently. Hgb 7.8. Periods are irregular, sometimes bleeds 2 weeks if she skips a period. Socioeconomic History      Marital status:       Spouse name: Not on file      Number of children: Not on file      Years of education: Not on file      Highest education level: Not on file    Occupational History      Not on file    Social Needs edema  NEURO: Oriented times three    ASSESSMENT AND PLAN:  Emmie Runner is a 30year old female. 1. Routine general medical examination at a health care facility  We will see Dr. Kassie Cooper for Pap and pelvic. Flu shot already received.     2. Hypothyroidism,

## 2020-04-24 DIAGNOSIS — D64.9 ANEMIA, UNSPECIFIED TYPE: Primary | ICD-10-CM

## 2020-06-01 PROCEDURE — 88305 TISSUE EXAM BY PATHOLOGIST: CPT | Performed by: OBSTETRICS & GYNECOLOGY

## 2020-06-11 ENCOUNTER — TELEPHONE (OUTPATIENT)
Dept: FAMILY MEDICINE CLINIC | Facility: CLINIC | Age: 30
End: 2020-06-11

## 2020-06-11 ENCOUNTER — OFFICE VISIT (OUTPATIENT)
Dept: FAMILY MEDICINE CLINIC | Facility: CLINIC | Age: 30
End: 2020-06-11
Payer: COMMERCIAL

## 2020-06-11 VITALS
RESPIRATION RATE: 16 BRPM | DIASTOLIC BLOOD PRESSURE: 76 MMHG | BODY MASS INDEX: 43.4 KG/M2 | HEIGHT: 69 IN | TEMPERATURE: 98 F | HEART RATE: 88 BPM | SYSTOLIC BLOOD PRESSURE: 126 MMHG | WEIGHT: 293 LBS

## 2020-06-11 DIAGNOSIS — N64.4 BREAST PAIN, RIGHT: Primary | ICD-10-CM

## 2020-06-11 PROBLEM — O34.32 INCOMPETENT CERVIX IN PREGNANCY, ANTEPARTUM, SECOND TRIMESTER: Status: RESOLVED | Noted: 2018-12-17 | Resolved: 2020-06-11

## 2020-06-11 PROBLEM — O34.32 INCOMPETENT CERVIX IN PREGNANCY, ANTEPARTUM, SECOND TRIMESTER (HCC): Status: RESOLVED | Noted: 2018-12-17 | Resolved: 2020-06-11

## 2020-06-11 PROBLEM — N98.1 OVARIAN HYPERSTIMULATION SYNDROME: Status: RESOLVED | Noted: 2018-05-22 | Resolved: 2020-06-11

## 2020-06-11 PROCEDURE — 99213 OFFICE O/P EST LOW 20 MIN: CPT | Performed by: NURSE PRACTITIONER

## 2020-06-11 RX ORDER — NAPROXEN 500 MG/1
500 TABLET ORAL 2 TIMES DAILY WITH MEALS
Qty: 10 TABLET | Refills: 0 | Status: SHIPPED | OUTPATIENT
Start: 2020-06-11 | End: 2021-01-20

## 2020-06-11 NOTE — PROGRESS NOTES
Patient presents with:  Breast Pain: Right breat pain that started during the night. Denies chest pain, SOB.       HPI:  Presents with complaints of right outer breast pain, described as a dull ache with intermittent sharp stab, which is not associated with MG Oral Tab Take 1 tablet by mouth daily. 3 Package 0   • IRON OR Take 1 tablet by mouth daily. • Multiple Vitamin (MULTI-VITAMIN DAILY) Oral Tab Take by mouth.          Physical Exam  /76   Pulse 88   Temp 97.8 °F (36.6 °C)   Resp 16   Ht 69\"

## 2020-06-11 NOTE — TELEPHONE ENCOUNTER
Pt woke up with right breast pain today. Denies redness or swelling at site. Also denies chest pain or SOB. Pain is described as level 3. Appt today to assess.

## 2020-12-11 ENCOUNTER — TELEPHONE (OUTPATIENT)
Dept: FAMILY MEDICINE CLINIC | Facility: CLINIC | Age: 30
End: 2020-12-11

## 2020-12-11 NOTE — TELEPHONE ENCOUNTER
Patient called requesting to speak with nurse regarding what to do next, she lost her sense of taste, she tested for COVID and received her negative results yesterday. Wants to know if could be something else?  Please advise

## 2020-12-11 NOTE — TELEPHONE ENCOUNTER
Called and talked to patient she was tested at [facility] and was neg but only had symptoms for 1 day before testing. I told her to wait 5 days from start of symptoms then get tested.  If still Neg she will call back

## 2020-12-16 ENCOUNTER — TELEPHONE (OUTPATIENT)
Dept: FAMILY MEDICINE CLINIC | Facility: CLINIC | Age: 30
End: 2020-12-16

## 2020-12-16 NOTE — TELEPHONE ENCOUNTER
Called and she was given PCR test by [facility] and was Neg this AM woke up unable to taste any thing but now it is getting better told her OK to be around family if wearing mask and keep distance if she has other symptoms please call office

## 2020-12-16 NOTE — TELEPHONE ENCOUNTER
Patient has loss of taste was tested twice now with both negative results, last tested 12/14/20.  Patient would like to know what her next steps should be, offered her a virtual visit with a provider and she declined, states last time she just spoke with a

## 2021-01-19 ENCOUNTER — TELEPHONE (OUTPATIENT)
Dept: FAMILY MEDICINE CLINIC | Facility: CLINIC | Age: 31
End: 2021-01-19

## 2021-01-19 NOTE — TELEPHONE ENCOUNTER
Pt states she went to immediate care a few weeks ago an was prescribed Zoloft. Pt requesting for a refill from Dr. Lopez Grimaldo. Advised pt on appointment first. Pt states she will wait for a response from Dr. Lopez Grimaldo.

## 2021-01-19 NOTE — TELEPHONE ENCOUNTER
Needs appointment  We do not have Immediate Care records. Would need to f/u here either way as we have never seen her for this medication.     Routed to front staff for scheduling

## 2021-01-22 NOTE — PROGRESS NOTES
Patient presents with:  Medication Follow-Up: for anxeity medication, no issues with the medication, only side effect was being tired in the beginning        [facility]  Samantha Castillo is a 35year old female who presents for follow up anxiety tobacco: Never Used    Alcohol use: No    Drug use: No       01/20/21  1445   BP: 118/70   Pulse: 86   Resp: 18   Temp: 98.1 °F (36.7 °C)       PHYSICAL EXAM  GENERAL:  Alert and in no acute distress. Well groomed and appropriately dressed.   Cassie Fremont

## 2021-02-15 ENCOUNTER — TELEPHONE (OUTPATIENT)
Dept: FAMILY MEDICINE CLINIC | Facility: CLINIC | Age: 31
End: 2021-02-15

## 2021-02-15 NOTE — TELEPHONE ENCOUNTER
Pt was prescribed fluoxetine HCI 10 mg and Alprazolam 0.5 mg and she is doing well on both but is still having anxiety episodes once a week but feels she is doing well with Fluoxetine  every day.  She will be out of the medication in the next week but is go

## 2021-02-15 NOTE — TELEPHONE ENCOUNTER
Called pt someone said hello then call was disconnected. Called again phone only rang. Unable to advise pt on notes.

## 2021-02-15 NOTE — TELEPHONE ENCOUNTER
No future appointments.    At [address] [place] [address] (1/20/21) was told to f/u in 1 month    Routed to front staff for appt scheduling    Please route back to triage once scheduled

## 2021-02-17 NOTE — PROGRESS NOTES
Telemedicine Visit     Virtual Video Check-In    Susie Ham verbally consents to Video Check-In service on 01/25/21. Susie Ham understands and accepts financial responsibility for any deductible, co-insurance and/or co-pays associated with this service. relieving techniques. Follow up for persistent or worsening symptoms. Follow up: 3 months, sooner if needed.   Duration of visit/documentation, in minutes: 14 min     Please note that the following visit was completed using two-way, real-time interactive

## 2021-08-20 ENCOUNTER — TELEPHONE (OUTPATIENT)
Dept: FAMILY MEDICINE CLINIC | Facility: CLINIC | Age: 31
End: 2021-08-20

## 2021-08-20 NOTE — TELEPHONE ENCOUNTER
Spoke to pt who's requesting to speak to a nurse. Pt has been on menstruating for over a month and requesting a prescription to help stop her period.

## 2021-08-20 NOTE — TELEPHONE ENCOUNTER
Patient states she has been menstruating x 1 month. She states this happened to her about a year ago. Has PCOS. Normally goes several months between periods. Has been walking more since she went back to work which triggered a period.  She got her period 7/2

## 2021-08-21 RX ORDER — MEDROXYPROGESTERONE ACETATE 10 MG/1
10 TABLET ORAL 2 TIMES DAILY
Qty: 20 TABLET | Refills: 1 | Status: SHIPPED | OUTPATIENT
Start: 2021-08-21 | End: 2021-08-31

## 2021-09-22 ENCOUNTER — HOSPITAL ENCOUNTER (EMERGENCY)
Facility: HOSPITAL | Age: 31
Discharge: HOME | End: 2021-09-22
Attending: EMERGENCY MEDICINE
Payer: COMMERCIAL

## 2021-09-22 VITALS
SYSTOLIC BLOOD PRESSURE: 125 MMHG | DIASTOLIC BLOOD PRESSURE: 80 MMHG | RESPIRATION RATE: 16 BRPM | OXYGEN SATURATION: 100 % | HEART RATE: 80 BPM | TEMPERATURE: 98 F

## 2021-09-22 DIAGNOSIS — D64.9 ANEMIA, UNSPECIFIED TYPE: Primary | ICD-10-CM

## 2021-09-22 LAB
ALBUMIN SERPL-MCNC: 3.5 G/DL (ref 3.4–5)
ALBUMIN/GLOB SERPL: 0.9 {RATIO} (ref 1–2)
ALP LIVER SERPL-CCNC: 76 U/L
ALT SERPL-CCNC: 36 U/L
ANION GAP SERPL CALC-SCNC: 3 MMOL/L (ref 0–18)
ANTIBODY SCREEN: NEGATIVE
APTT PPP: 31.7 SECONDS (ref 25.4–36.1)
AST SERPL-CCNC: 19 U/L (ref 15–37)
BASOPHILS # BLD AUTO: 0.05 X10(3) UL (ref 0–0.2)
BASOPHILS NFR BLD AUTO: 0.5 %
BILIRUB SERPL-MCNC: 0.3 MG/DL (ref 0.1–2)
BUN BLD-MCNC: 9 MG/DL (ref 7–18)
CALCIUM BLD-MCNC: 8.8 MG/DL (ref 8.5–10.1)
CHLORIDE SERPL-SCNC: 111 MMOL/L (ref 98–112)
CO2 SERPL-SCNC: 26 MMOL/L (ref 21–32)
CREAT BLD-MCNC: 0.88 MG/DL
EOSINOPHIL # BLD AUTO: 0.3 X10(3) UL (ref 0–0.7)
EOSINOPHIL NFR BLD AUTO: 2.9 %
ERYTHROCYTE [DISTWIDTH] IN BLOOD BY AUTOMATED COUNT: 17.9 %
GLOBULIN PLAS-MCNC: 3.9 G/DL (ref 2.8–4.4)
GLUCOSE BLD-MCNC: 90 MG/DL (ref 70–99)
HCT VFR BLD AUTO: 25 %
HGB BLD-MCNC: 7.7 G/DL
IMM GRANULOCYTES # BLD AUTO: 0.07 X10(3) UL (ref 0–1)
IMM GRANULOCYTES NFR BLD: 0.7 %
INR BLD: 1 (ref 0.89–1.11)
LYMPHOCYTES # BLD AUTO: 3.75 X10(3) UL (ref 1–4)
LYMPHOCYTES NFR BLD AUTO: 36.8 %
MCH RBC QN AUTO: 25.9 PG (ref 26–34)
MCHC RBC AUTO-ENTMCNC: 30.8 G/DL (ref 31–37)
MCV RBC AUTO: 84.2 FL
MONOCYTES # BLD AUTO: 0.59 X10(3) UL (ref 0.1–1)
MONOCYTES NFR BLD AUTO: 5.8 %
NEUTROPHILS # BLD AUTO: 5.43 X10 (3) UL (ref 1.5–7.7)
NEUTROPHILS # BLD AUTO: 5.43 X10(3) UL (ref 1.5–7.7)
NEUTROPHILS NFR BLD AUTO: 53.3 %
OSMOLALITY SERPL CALC.SUM OF ELEC: 288 MOSM/KG (ref 275–295)
PLATELET # BLD AUTO: 331 10(3)UL (ref 150–450)
POTASSIUM SERPL-SCNC: 3.6 MMOL/L (ref 3.5–5.1)
PROT SERPL-MCNC: 7.4 G/DL (ref 6.4–8.2)
PROTHROMBIN TIME: 13.4 SECONDS (ref 12.2–14.5)
RBC # BLD AUTO: 2.97 X10(6)UL
RH BLOOD TYPE: POSITIVE
SODIUM SERPL-SCNC: 140 MMOL/L (ref 136–145)
WBC # BLD AUTO: 10.2 X10(3) UL (ref 4–11)

## 2021-09-22 PROCEDURE — 86900 BLOOD TYPING SEROLOGIC ABO: CPT

## 2021-09-22 PROCEDURE — 99291 CRITICAL CARE FIRST HOUR: CPT | Performed by: EMERGENCY MEDICINE

## 2021-09-22 PROCEDURE — 86850 RBC ANTIBODY SCREEN: CPT

## 2021-09-22 PROCEDURE — 36430 TRANSFUSION BLD/BLD COMPNT: CPT

## 2021-09-22 PROCEDURE — 86900 BLOOD TYPING SEROLOGIC ABO: CPT | Performed by: EMERGENCY MEDICINE

## 2021-09-22 PROCEDURE — 86901 BLOOD TYPING SEROLOGIC RH(D): CPT

## 2021-09-22 PROCEDURE — 36415 COLL VENOUS BLD VENIPUNCTURE: CPT | Performed by: EMERGENCY MEDICINE

## 2021-09-22 PROCEDURE — 86901 BLOOD TYPING SEROLOGIC RH(D): CPT | Performed by: EMERGENCY MEDICINE

## 2021-09-22 PROCEDURE — 86920 COMPATIBILITY TEST SPIN: CPT

## 2021-09-22 PROCEDURE — 86850 RBC ANTIBODY SCREEN: CPT | Performed by: EMERGENCY MEDICINE

## 2021-09-22 PROCEDURE — 85025 COMPLETE CBC W/AUTO DIFF WBC: CPT | Performed by: EMERGENCY MEDICINE

## 2021-09-22 PROCEDURE — 80053 COMPREHEN METABOLIC PANEL: CPT | Performed by: EMERGENCY MEDICINE

## 2021-09-22 PROCEDURE — 85730 THROMBOPLASTIN TIME PARTIAL: CPT | Performed by: EMERGENCY MEDICINE

## 2021-09-22 PROCEDURE — 85610 PROTHROMBIN TIME: CPT | Performed by: EMERGENCY MEDICINE

## 2021-09-22 NOTE — ED INITIAL ASSESSMENT (HPI)
Pt states vaginal bleeding x months.  States recent CBC done with OB was low and OB sent her here for blood transfusion   Denies recent pregnancy

## 2021-09-23 NOTE — ED PROVIDER NOTES
Patient Seen in: [facility] Emergency Department      History   Patient presents with:  Bleeding    Stated Complaint: bleeding, hgb 7.8    Subjective:   HPI    Patient is a 58-year-old female presents emergency room for evaluation of anemia.   Gianluca 09/22/21 1729 17   Temp 09/22/21 1729 97.7 °F (36.5 °C)   Temp src 09/22/21 2046 Temporal   SpO2 09/22/21 1729 98 %   O2 Device 09/22/21 1729 None (Room air)       Current:/72   Pulse 89   Temp 98.3 °F (36.8 °C) (Temporal)   Resp 16   SpO2 100% Final result                 Please view results for these tests on the individual orders. TYPE AND SCREEN    Narrative: The following orders were created for panel order Type and screen.   Procedure                               Abnormality         S treatment or admission on an emergency basis. Comprehensive verbal and written discharge and follow-up instructions were provided to help prevent relapse or worsening.   Patient was instructed to follow-up with her primary care provider for further evaluat

## 2021-09-24 LAB — BLOOD TYPE BARCODE: 6200

## 2021-09-27 NOTE — PROGRESS NOTES
Telemedicine Visit     Virtual Video Check-In    Brenden Erickson verbally consents to Video Check-In service on 9/22. Brenden Erickson understands and accepts financial responsibility for any deductible, co-insurance and/or co-pays associated with this service.  Ramirez Lai Generalized anxiety disorder    2. Iron deficiency anemia due to chronic blood loss  Will go ahead and have her get started on prozac. Did better on 20 mg than 10. Can try to start at higher dose as long as its tolerated.  May be worsening sxs due to the an

## 2021-12-20 NOTE — TELEPHONE ENCOUNTER
Refill request for:    Requested Prescriptions     Pending Prescriptions Disp Refills   • FLUOXETINE 20 MG Oral Cap [Pharmacy Med Name: FLUOXETINE 20MG CAPSULES] 90 capsule 0     Sig: TAKE 1 CAPSULE(20 MG) BY MOUTH DAILY        Last Prescribed Quantity Ref

## 2021-12-21 RX ORDER — FLUOXETINE HYDROCHLORIDE 20 MG/1
CAPSULE ORAL
Qty: 90 CAPSULE | Refills: 0 | Status: SHIPPED | OUTPATIENT
Start: 2021-12-21

## 2022-12-08 ENCOUNTER — TELEPHONE (OUTPATIENT)
Dept: FAMILY MEDICINE CLINIC | Facility: CLINIC | Age: 32
End: 2022-12-08

## 2022-12-08 DIAGNOSIS — Z00.00 ROUTINE GENERAL MEDICAL EXAMINATION AT A HEALTH CARE FACILITY: Primary | ICD-10-CM

## 2022-12-08 NOTE — TELEPHONE ENCOUNTER
Please enter lab orders for the patient's upcoming physical appointment. Physical scheduled: Your appointments     Date & Time Appointment Department [facility])    Feb 14, 2023 12:30 PM CST Adult Physical with Neisha Barahona MD [address] [facility]  ([address])    PLEASE NOTE - Most insurances allow a Complete Physical once every 366 days. Please schedule accordingly. Please arrive 15 minutes prior to your scheduled appointment. Please also bring your Insurance card, Photo ID, and your medication bottles or a list of your current medication. If you no longer require this appointment, please contact your physician office to cancel. Milly Soriano [address] [facility] [place] [phone]         [facility]  [facility])     The patient has been notified to complete fasting labs prior to their physical appointment.

## 2023-01-11 ENCOUNTER — PATIENT MESSAGE (OUTPATIENT)
Dept: FAMILY MEDICINE CLINIC | Facility: CLINIC | Age: 33
End: 2023-01-11

## 2023-01-11 DIAGNOSIS — Z13.21 ENCOUNTER FOR VITAMIN DEFICIENCY SCREENING: Primary | ICD-10-CM

## 2023-01-11 DIAGNOSIS — Z00.00 ROUTINE GENERAL MEDICAL EXAMINATION AT A HEALTH CARE FACILITY: ICD-10-CM

## 2023-01-11 NOTE — TELEPHONE ENCOUNTER
From: Aris Palacios  To: Chhaya Terry MD  Sent: 1/11/2023 1:10 PM CST  Subject: Add vitamin d check? Hi dr walker I was hoping to add a vitamin d check up on my blood draw if possible.  Thank you

## 2023-02-12 ENCOUNTER — APPOINTMENT (OUTPATIENT)
Dept: LAB | Facility: HOSPITAL | Age: 33
End: 2023-02-12
Attending: FAMILY MEDICINE
Payer: COMMERCIAL

## 2023-02-12 DIAGNOSIS — Z00.00 ROUTINE GENERAL MEDICAL EXAMINATION AT A HEALTH CARE FACILITY: ICD-10-CM

## 2023-02-12 LAB
ALBUMIN SERPL-MCNC: 3.8 G/DL (ref 3.4–5)
ALBUMIN/GLOB SERPL: 1 {RATIO} (ref 1–2)
ALP LIVER SERPL-CCNC: 89 U/L
ALT SERPL-CCNC: 40 U/L
ANION GAP SERPL CALC-SCNC: 8 MMOL/L (ref 0–18)
AST SERPL-CCNC: 19 U/L (ref 15–37)
BILIRUB SERPL-MCNC: 0.5 MG/DL (ref 0.1–2)
BUN BLD-MCNC: 9 MG/DL (ref 7–18)
CALCIUM BLD-MCNC: 9.1 MG/DL (ref 8.5–10.1)
CHLORIDE SERPL-SCNC: 108 MMOL/L (ref 98–112)
CHOLEST SERPL-MCNC: 151 MG/DL (ref ?–200)
CO2 SERPL-SCNC: 24 MMOL/L (ref 21–32)
CREAT BLD-MCNC: 0.87 MG/DL
ERYTHROCYTE [DISTWIDTH] IN BLOOD BY AUTOMATED COUNT: 15.9 %
FASTING PATIENT LIPID ANSWER: YES
FASTING STATUS PATIENT QL REPORTED: YES
GFR SERPLBLD BASED ON 1.73 SQ M-ARVRAT: 91 ML/MIN/1.73M2 (ref 60–?)
GLOBULIN PLAS-MCNC: 4 G/DL (ref 2.8–4.4)
GLUCOSE BLD-MCNC: 100 MG/DL (ref 70–99)
HCT VFR BLD AUTO: 40.1 %
HDLC SERPL-MCNC: 35 MG/DL (ref 40–59)
HGB BLD-MCNC: 12.4 G/DL
LDLC SERPL CALC-MCNC: 94 MG/DL (ref ?–100)
MCH RBC QN AUTO: 24 PG (ref 26–34)
MCHC RBC AUTO-ENTMCNC: 30.9 G/DL (ref 31–37)
MCV RBC AUTO: 77.6 FL
NONHDLC SERPL-MCNC: 116 MG/DL (ref ?–130)
OSMOLALITY SERPL CALC.SUM OF ELEC: 289 MOSM/KG (ref 275–295)
PLATELET # BLD AUTO: 260 10(3)UL (ref 150–450)
POTASSIUM SERPL-SCNC: 3.8 MMOL/L (ref 3.5–5.1)
PROT SERPL-MCNC: 7.8 G/DL (ref 6.4–8.2)
RBC # BLD AUTO: 5.17 X10(6)UL
SODIUM SERPL-SCNC: 140 MMOL/L (ref 136–145)
TRIGL SERPL-MCNC: 122 MG/DL (ref 30–149)
TSI SER-ACNC: 3.63 MIU/ML (ref 0.36–3.74)
VIT D+METAB SERPL-MCNC: 8.9 NG/ML (ref 30–100)
VLDLC SERPL CALC-MCNC: 20 MG/DL (ref 0–30)
WBC # BLD AUTO: 6.4 X10(3) UL (ref 4–11)

## 2023-02-12 PROCEDURE — 36415 COLL VENOUS BLD VENIPUNCTURE: CPT

## 2023-02-12 PROCEDURE — 80061 LIPID PANEL: CPT

## 2023-02-12 PROCEDURE — 85027 COMPLETE CBC AUTOMATED: CPT

## 2023-02-12 PROCEDURE — 80053 COMPREHEN METABOLIC PANEL: CPT

## 2023-02-12 PROCEDURE — 82306 VITAMIN D 25 HYDROXY: CPT

## 2023-02-12 PROCEDURE — 84443 ASSAY THYROID STIM HORMONE: CPT

## 2023-02-14 ENCOUNTER — OFFICE VISIT (OUTPATIENT)
Dept: FAMILY MEDICINE CLINIC | Facility: CLINIC | Age: 33
End: 2023-02-14
Payer: COMMERCIAL

## 2023-02-14 VITALS
SYSTOLIC BLOOD PRESSURE: 120 MMHG | BODY MASS INDEX: 43.4 KG/M2 | HEIGHT: 69 IN | HEART RATE: 80 BPM | TEMPERATURE: 98 F | WEIGHT: 293 LBS | DIASTOLIC BLOOD PRESSURE: 78 MMHG

## 2023-02-14 DIAGNOSIS — E55.9 VITAMIN D DEFICIENCY: ICD-10-CM

## 2023-02-14 DIAGNOSIS — Z00.00 ROUTINE GENERAL MEDICAL EXAMINATION AT A HEALTH CARE FACILITY: Primary | ICD-10-CM

## 2023-02-14 PROCEDURE — 99395 PREV VISIT EST AGE 18-39: CPT | Performed by: FAMILY MEDICINE

## 2023-02-14 PROCEDURE — 3078F DIAST BP <80 MM HG: CPT | Performed by: FAMILY MEDICINE

## 2023-02-14 PROCEDURE — 3008F BODY MASS INDEX DOCD: CPT | Performed by: FAMILY MEDICINE

## 2023-02-14 PROCEDURE — 3074F SYST BP LT 130 MM HG: CPT | Performed by: FAMILY MEDICINE

## 2023-02-14 RX ORDER — FLUOXETINE HYDROCHLORIDE 20 MG/1
20 CAPSULE ORAL DAILY
Qty: 90 CAPSULE | Refills: 3 | Status: SHIPPED | OUTPATIENT
Start: 2023-02-14

## 2023-02-14 RX ORDER — ERGOCALCIFEROL 1.25 MG/1
50000 CAPSULE ORAL WEEKLY
Qty: 12 CAPSULE | Refills: 0 | Status: SHIPPED | OUTPATIENT
Start: 2023-02-14 | End: 2023-03-16

## 2023-03-23 ENCOUNTER — OFFICE VISIT (OUTPATIENT)
Dept: INTERNAL MEDICINE CLINIC | Facility: CLINIC | Age: 33
End: 2023-03-23
Payer: COMMERCIAL

## 2023-03-23 VITALS
SYSTOLIC BLOOD PRESSURE: 118 MMHG | WEIGHT: 293 LBS | HEIGHT: 69 IN | RESPIRATION RATE: 18 BRPM | HEART RATE: 70 BPM | BODY MASS INDEX: 43.4 KG/M2 | DIASTOLIC BLOOD PRESSURE: 70 MMHG | OXYGEN SATURATION: 99 %

## 2023-03-23 DIAGNOSIS — E78.6 LOW HDL (UNDER 40): ICD-10-CM

## 2023-03-23 DIAGNOSIS — E55.9 VITAMIN D DEFICIENCY: ICD-10-CM

## 2023-03-23 DIAGNOSIS — E66.01 OBESITY, MORBID, BMI 40.0-49.9 (HCC): ICD-10-CM

## 2023-03-23 DIAGNOSIS — Z51.81 THERAPEUTIC DRUG MONITORING: Primary | ICD-10-CM

## 2023-03-23 PROCEDURE — 3074F SYST BP LT 130 MM HG: CPT | Performed by: NURSE PRACTITIONER

## 2023-03-23 PROCEDURE — 3078F DIAST BP <80 MM HG: CPT | Performed by: NURSE PRACTITIONER

## 2023-03-23 PROCEDURE — 99214 OFFICE O/P EST MOD 30 MIN: CPT | Performed by: NURSE PRACTITIONER

## 2023-03-23 PROCEDURE — 3008F BODY MASS INDEX DOCD: CPT | Performed by: NURSE PRACTITIONER

## 2023-03-23 NOTE — PATIENT INSTRUCTIONS
Welcome to the [facility] Weight Management Program!!  Thank you for placing your trust in our health care team, I look forward to working with you along this journey to better health! Next steps:     1.  Schedule a personal nutrition consultation with one of our registered dieticians. Bring along your food journal (3 days minimum). See journal options below. 2.  Fill your prescribed medication and take as discussed and prescribed: Will trial wegovy 0.25mg weekly X 4 weeks (sample, demo given and 0.5mg sent to pharm)       Please try to work on the following dietary changes this first month:  Daily protein recommendation to start: 100-120 grams  Daily carbohydrate: <170g   Daily calories: 2,000  1. Drink water with meals and throughout the day, cut down on soda and/or juice if consumed. Consider flavored water options like Bubbly, Spindrift, Hint and Anuj. 2.  Eat breakfast daily and focus on having protein with each meal, examples include: greek yogurt, cottage cheese, hard boiled egg, whole grain toast with peanut butter. 3.  Reduce refined carbohydrates and sugars which includes items such as sweets, as well as rice, pasta, and bread and make sure to choose whole grain options when having them with just 1 serving per meal about the size of your inner palm. 4.  Consume non starchy veggies daily working towards making them a good 50% of your daily food intake. Add them to lunch and dinner consistently. 5.  Start a daily probiotic: VSL#3 is recommended, (order on line at www.vsl3. com). Take 1 capsule daily with water for 30 days, then reduce to 1 every other day (this will reduce the cost). Capsules can be left out for 2 weeks, but then must be refrigerated. Please download saw [place] Suzanne Coon! Or Net Diary to monitor daily dietary intake and you will be able to see if you are eating the right amount of calories or too much or too little which would hinder weight loss.  Additionally this will help to see your daily carbohydrate and protein intake. When you set the arleth up choose 1-2 lbs/week as a goal.  Keeping a paper food journal is an option as well to remain accountable for your choices- this is the start to mindful eating! A low calorie diet has been consistently shown to support weight loss. Continue or start exercising to help establish a routine. If not already exercising begin with 1 day and progress as able with long-term goal of 30 minutes 5 days a week at a minimum. Meditation daily can help manage and control stress. Chronic stress can make weight loss difficult. Exercising is one way to help with stress, but meditation using the CALM Arleth or another comparable alternative can be done in your home or place of work with little time commitment. This Arleth can also help work on behavior change and improve sleep. Check out the segment under Calm Masterclass and listen to The 4 Pillars of Health. A great way to begin learning about the foundation of lifestyle with practical tips to use in your every day. Check out www.yourweightmatters. org blog for continued daily support and education along this weight loss journey! Patient Resources:     Personal Training/Fitness Classes/Health Coaching     Tosin Amaya and Montoya Sophiaside @ [email or web address]/ Full fitness center with group fitness and personal training. Discount available as client of [facility]. Health Coaching and Personal Training with Deon Millermadie at our [facility]- individual weekly coaching with option to add personal training and small group fitness classes targeted at weight loss- [phone] and/or email @ [email or web address]  360FIT [place] [email or web address] Group Fitness [phone] and/or email Bertha Ortiz at [email or web address]  [address] with multiple locations: Rosemary (www.International Gaming League), Eat The Frog Fitness (www.eatDebtLESS Community. Mocapay), Fit Body Bootcamp (www.fitbodybootProbki Iz oknap.Mocapay), Nyxoah Fitness (www.Funky Android), The Exercise  (www.exercisecoach.Mocapay)     Online Fitness  Fitness  on Whole Foods in 10 DVD series- www. yrlwh59NEV. Mocapay  Sit and Be Fit - Chair exercise series Www.sitandbefit. org  Hip Hop Fit with Denver Hall at www.hiphopfit. net     Apps for on the [employer] 7 Minute Workout (orange box with white 7) - free on the go HIIT training arleth  Peloton Arleth @ Kickserv     Nutrition Trackers and Tools  LoseIT! And My Fitness Pal apps and on line for tracking nutrition  NOOM - virtual health coaching  FitFoundation (healthy meals on the go) in [facility] @ DIVINE BOOKS [email or web address]. Radha Harkins MD @ wwwPolymita Technologies and Philly Vasquez (keto and low carb plans recommended) @ www. OFLLQZ36.NZS, Metabolic Meals @ www. MyMetabolicMeals. com - individual prepared meals to go  ThumbAd, Knodium, International Business Machines, Every Plate, [employer]- on line meal delivery programs for preparation at home  [place] [employer] in [place] for homemade meals to go @ www.Fundology. Mocapay  Diet Doctor @ www. dietdoctor. com - low carb swaps  99 Fahrenheit - meal prep and planning arleth (www.yummly. com)     Stress Management/Behavior/Mindful Eating  CALM meditation arleth (www.calm. Mocapay)  Headspace  Am I Hungry? Mindful eating virtual  arleth  Www.yourweightmatters. org - Obesity Action Coalition sponsored Blog posts daily  Motivation arleth (black box with white \")- daily supportive messages sent to your phone     Books/Video Education/Podcasts  Mindless Eating by Damari Becerra  Why We Get Sick by Praneeth Carranza (a book about insulin resistance)  Atomic Habits by Crispin Kern (a book about taking small steps to promote greater behavior change)   Can't Hurt Me by Sophie Greco (a book exploring the power of discipline in achieving your goals)  The End of Dieting:  How to Live for Life by Dr. Deshaun William M.D. or listen to The [address] [facility] [address] Episode 63: Understanding \"Nutritarian\" Eating w/Dr. Anastasia Wiggins  Your Body in Balance: The Basis Science of Food, Hormones, and Health by Dr. Clare Wilks  The Menopause Diet Plan by Brianna Winston [facility]OSP AT [facility]  The Complete Guide to fasting by Dr. Shantanu Galvez, [address] [facility] by Yumiko Gregory, Ph.D, R.D. Weight Loss Surgery Will Not Treat Food Addiction by Hamilton Faustin Ph.D  The [address] [place] [address] on plant based nutrition  Fed Up - documentary about obesity (Free on nodila)  The Truth About Sugar - documentary on sugar (Free on OnAsset Intelligence, https://youAndroBioSysu. be/7C3tchzSG1s)  The Dr. Aldair Guido by Dr. Anitha Lopez MD  Fitlosophy Fitspiration - journal to better health (found at [employer] in fitness aisle)  What Happened to You?- a look at the impact trauma has on behavior written by Trinidad Perez and Dr. Alexey Ritter Again by Jalen Bustamante - discovering your true self after trauma  Aby  talk on Site Tour, The Call to Courage  Podcasts: The Exam Room by the Physician's Committee, Nutrition Facts by Dr. Annmarie Stanley    We are here to support you with weight loss, but please remember that you still need your primary care provider for your routine health maintenance.

## 2023-03-28 ENCOUNTER — PATIENT MESSAGE (OUTPATIENT)
Dept: INTERNAL MEDICINE CLINIC | Facility: CLINIC | Age: 33
End: 2023-03-28

## 2023-03-28 NOTE — TELEPHONE ENCOUNTER
From: Mary Anderson  To: POLI Lara  Sent: 3/28/2023 11:00 AM CDT  Subject: Bibianajoanne Hay injection misfire    Hi! I took my first shot everything is good but i accidentally misfired the first one and it leaked.  Are you able to send the prescription in a week earlier or am I able to  an extra sample injection

## 2023-04-12 ENCOUNTER — OFFICE VISIT (OUTPATIENT)
Facility: CLINIC | Age: 33
End: 2023-04-12
Payer: COMMERCIAL

## 2023-04-12 ENCOUNTER — PATIENT MESSAGE (OUTPATIENT)
Dept: INTERNAL MEDICINE CLINIC | Facility: CLINIC | Age: 33
End: 2023-04-12

## 2023-04-12 VITALS
BODY MASS INDEX: 43.4 KG/M2 | HEIGHT: 69 IN | WEIGHT: 293 LBS | HEART RATE: 94 BPM | SYSTOLIC BLOOD PRESSURE: 120 MMHG | DIASTOLIC BLOOD PRESSURE: 80 MMHG

## 2023-04-12 DIAGNOSIS — N92.6 IRREGULAR PERIODS: Primary | ICD-10-CM

## 2023-04-12 DIAGNOSIS — N92.0 MENORRHAGIA WITH REGULAR CYCLE: ICD-10-CM

## 2023-04-12 DIAGNOSIS — Z30.09 ENCOUNTER FOR COUNSELING REGARDING CONTRACEPTION: ICD-10-CM

## 2023-04-12 LAB
CONTROL LINE PRESENT WITH A CLEAR BACKGROUND (YES/NO): YES YES/NO
KIT LOT #: NORMAL NUMERIC
PREGNANCY TEST, URINE: NEGATIVE

## 2023-04-12 PROCEDURE — 99213 OFFICE O/P EST LOW 20 MIN: CPT

## 2023-04-12 PROCEDURE — 3079F DIAST BP 80-89 MM HG: CPT

## 2023-04-12 PROCEDURE — 81025 URINE PREGNANCY TEST: CPT

## 2023-04-12 PROCEDURE — 3008F BODY MASS INDEX DOCD: CPT

## 2023-04-12 PROCEDURE — 3074F SYST BP LT 130 MM HG: CPT

## 2023-04-12 RX ORDER — MEDROXYPROGESTERONE ACETATE 10 MG/1
10 TABLET ORAL DAILY
Qty: 10 TABLET | Refills: 0 | Status: SHIPPED | OUTPATIENT
Start: 2023-04-12 | End: 2023-04-22

## 2023-04-18 ENCOUNTER — HOSPITAL ENCOUNTER (EMERGENCY)
Facility: HOSPITAL | Age: 33
Discharge: HOME | End: 2023-04-18
Attending: EMERGENCY MEDICINE
Payer: COMMERCIAL

## 2023-04-18 VITALS
BODY MASS INDEX: 43.4 KG/M2 | HEART RATE: 68 BPM | WEIGHT: 293 LBS | OXYGEN SATURATION: 99 % | RESPIRATION RATE: 13 BRPM | DIASTOLIC BLOOD PRESSURE: 65 MMHG | TEMPERATURE: 98 F | SYSTOLIC BLOOD PRESSURE: 129 MMHG | HEIGHT: 69 IN

## 2023-04-18 DIAGNOSIS — N93.8 DYSFUNCTIONAL UTERINE BLEEDING: Primary | ICD-10-CM

## 2023-04-18 LAB
ALBUMIN SERPL-MCNC: 3.4 G/DL (ref 3.4–5)
ALBUMIN/GLOB SERPL: 0.8 {RATIO} (ref 1–2)
ALP LIVER SERPL-CCNC: 73 U/L
ALT SERPL-CCNC: 34 U/L
ANION GAP SERPL CALC-SCNC: 4 MMOL/L (ref 0–18)
ANTIBODY SCREEN: NEGATIVE
AST SERPL-CCNC: 23 U/L (ref 15–37)
B-HCG UR QL: NEGATIVE
BASOPHILS # BLD AUTO: 0.08 X10(3) UL (ref 0–0.2)
BASOPHILS NFR BLD AUTO: 1.1 %
BILIRUB SERPL-MCNC: 0.2 MG/DL (ref 0.1–2)
BILIRUB UR QL STRIP.AUTO: NEGATIVE
BUN BLD-MCNC: 11 MG/DL (ref 7–18)
CALCIUM BLD-MCNC: 8.8 MG/DL (ref 8.5–10.1)
CHLORIDE SERPL-SCNC: 110 MMOL/L (ref 98–112)
CO2 SERPL-SCNC: 24 MMOL/L (ref 21–32)
CREAT BLD-MCNC: 0.99 MG/DL
EOSINOPHIL # BLD AUTO: 0.4 X10(3) UL (ref 0–0.7)
EOSINOPHIL NFR BLD AUTO: 5.5 %
ERYTHROCYTE [DISTWIDTH] IN BLOOD BY AUTOMATED COUNT: 17.9 %
GFR SERPLBLD BASED ON 1.73 SQ M-ARVRAT: 78 ML/MIN/1.73M2 (ref 60–?)
GLOBULIN PLAS-MCNC: 4.1 G/DL (ref 2.8–4.4)
GLUCOSE BLD-MCNC: 105 MG/DL (ref 70–99)
GLUCOSE UR STRIP.AUTO-MCNC: 50 MG/DL
HCT VFR BLD AUTO: 32.3 %
HGB BLD-MCNC: 9.7 G/DL
IMM GRANULOCYTES # BLD AUTO: 0.04 X10(3) UL (ref 0–1)
IMM GRANULOCYTES NFR BLD: 0.5 %
KETONES UR STRIP.AUTO-MCNC: NEGATIVE MG/DL
LEUKOCYTE ESTERASE UR QL STRIP.AUTO: NEGATIVE
LYMPHOCYTES # BLD AUTO: 3.58 X10(3) UL (ref 1–4)
LYMPHOCYTES NFR BLD AUTO: 49 %
MCH RBC QN AUTO: 24 PG (ref 26–34)
MCHC RBC AUTO-ENTMCNC: 30 G/DL (ref 31–37)
MCV RBC AUTO: 79.8 FL
MONOCYTES # BLD AUTO: 0.5 X10(3) UL (ref 0.1–1)
MONOCYTES NFR BLD AUTO: 6.8 %
NEUTROPHILS # BLD AUTO: 2.7 X10 (3) UL (ref 1.5–7.7)
NEUTROPHILS # BLD AUTO: 2.7 X10(3) UL (ref 1.5–7.7)
NEUTROPHILS NFR BLD AUTO: 37.1 %
NITRITE UR QL STRIP.AUTO: NEGATIVE
OSMOLALITY SERPL CALC.SUM OF ELEC: 286 MOSM/KG (ref 275–295)
PH UR STRIP.AUTO: 6 [PH] (ref 5–8)
PLATELET # BLD AUTO: 309 10(3)UL (ref 150–450)
POTASSIUM SERPL-SCNC: 3.4 MMOL/L (ref 3.5–5.1)
PROT SERPL-MCNC: 7.5 G/DL (ref 6.4–8.2)
PROT UR STRIP.AUTO-MCNC: 100 MG/DL
RBC # BLD AUTO: 4.05 X10(6)UL
RBC #/AREA URNS AUTO: >10 /HPF
RH BLOOD TYPE: POSITIVE
SODIUM SERPL-SCNC: 138 MMOL/L (ref 136–145)
SP GR UR STRIP.AUTO: 1.02 (ref 1–1.03)
UROBILINOGEN UR STRIP.AUTO-MCNC: <2 MG/DL
WBC # BLD AUTO: 7.3 X10(3) UL (ref 4–11)

## 2023-04-18 PROCEDURE — 86850 RBC ANTIBODY SCREEN: CPT

## 2023-04-18 PROCEDURE — 36415 COLL VENOUS BLD VENIPUNCTURE: CPT

## 2023-04-18 PROCEDURE — 81001 URINALYSIS AUTO W/SCOPE: CPT | Performed by: EMERGENCY MEDICINE

## 2023-04-18 PROCEDURE — 80053 COMPREHEN METABOLIC PANEL: CPT

## 2023-04-18 PROCEDURE — 86900 BLOOD TYPING SEROLOGIC ABO: CPT

## 2023-04-18 PROCEDURE — 80053 COMPREHEN METABOLIC PANEL: CPT | Performed by: EMERGENCY MEDICINE

## 2023-04-18 PROCEDURE — 86901 BLOOD TYPING SEROLOGIC RH(D): CPT

## 2023-04-18 PROCEDURE — 86901 BLOOD TYPING SEROLOGIC RH(D): CPT | Performed by: EMERGENCY MEDICINE

## 2023-04-18 PROCEDURE — 81001 URINALYSIS AUTO W/SCOPE: CPT

## 2023-04-18 PROCEDURE — 85025 COMPLETE CBC W/AUTO DIFF WBC: CPT

## 2023-04-18 PROCEDURE — 81025 URINE PREGNANCY TEST: CPT

## 2023-04-18 PROCEDURE — 85025 COMPLETE CBC W/AUTO DIFF WBC: CPT | Performed by: EMERGENCY MEDICINE

## 2023-04-18 PROCEDURE — 86850 RBC ANTIBODY SCREEN: CPT | Performed by: EMERGENCY MEDICINE

## 2023-04-18 PROCEDURE — 99284 EMERGENCY DEPT VISIT MOD MDM: CPT

## 2023-04-18 PROCEDURE — 99283 EMERGENCY DEPT VISIT LOW MDM: CPT

## 2023-04-18 PROCEDURE — 86900 BLOOD TYPING SEROLOGIC ABO: CPT | Performed by: EMERGENCY MEDICINE

## 2023-04-18 RX ORDER — MEDROXYPROGESTERONE ACETATE 10 MG/1
30 TABLET ORAL 3 TIMES DAILY
Qty: 63 TABLET | Refills: 0 | Status: SHIPPED | OUTPATIENT
Start: 2023-04-18 | End: 2023-04-25

## 2023-04-18 RX ORDER — ONDANSETRON 4 MG/1
4 TABLET, ORALLY DISINTEGRATING ORAL EVERY 4 HOURS PRN
Qty: 10 TABLET | Refills: 0 | Status: SHIPPED | OUTPATIENT
Start: 2023-04-18 | End: 2023-04-25

## 2023-04-18 NOTE — ED QUICK NOTES
Rounding Completed    Plan of Care reviewed. Waiting for lab results. Elimination needs assessed. Provided warm blanket. Bed is locked and in lowest position. Call light within reach.

## 2023-04-18 NOTE — ED INITIAL ASSESSMENT (HPI)
Patient states she has been bleeding for one month. New gyne prescribed Proestrogen which has not helped. Scheduled for Depo-shot. Similar episode last year, patient had to receive blood transfusion.

## 2023-04-19 ENCOUNTER — PATIENT MESSAGE (OUTPATIENT)
Dept: INTERNAL MEDICINE CLINIC | Facility: CLINIC | Age: 33
End: 2023-04-19

## 2023-04-19 NOTE — TELEPHONE ENCOUNTER
From: Rhenda Essex  To:  Celeste Lesches, APRN  Sent: 4/19/2023 7:17 AM CDT  Subject: Increase-prescription     : Hi i think its time to reorder and up my dose this Last prescription was just because i misfired a dose and i needed an extra pen that wegovy covereD so essentially it has been 4 weeks and I was wondering if we need to up the dosage

## 2023-04-19 NOTE — DISCHARGE INSTRUCTIONS
Provera 30 mg 3 times a day for 7 days  Zofran as needed for nausea  Return if worse  Follow-up with OB, call the office tomorrow morning for urgent outpatient follow-up

## 2023-04-19 NOTE — TELEPHONE ENCOUNTER
Requesting wegovy increase  LOV: 3/23/23  RTC: 7 weeks  Last Relevant Labs: na  Filled: 3/29/23 #2ml with 0 refills wegovy 0.25 mg     5/10/2023 11:20 AM Dhara Sanabria APRN EMGROSEMARYI EMG [facility] [address]

## 2023-04-26 ENCOUNTER — OFFICE VISIT (OUTPATIENT)
Facility: CLINIC | Age: 33
End: 2023-04-26
Payer: COMMERCIAL

## 2023-04-26 ENCOUNTER — TELEPHONE (OUTPATIENT)
Facility: CLINIC | Age: 33
End: 2023-04-26

## 2023-04-26 VITALS
BODY MASS INDEX: 45 KG/M2 | SYSTOLIC BLOOD PRESSURE: 145 MMHG | WEIGHT: 293 LBS | DIASTOLIC BLOOD PRESSURE: 90 MMHG | HEART RATE: 82 BPM

## 2023-04-26 DIAGNOSIS — Z32.02 PREGNANCY EXAMINATION OR TEST, NEGATIVE RESULT: Primary | ICD-10-CM

## 2023-04-26 DIAGNOSIS — Z30.42 DEPOT CONTRACEPTION: ICD-10-CM

## 2023-04-26 LAB
CONTROL LINE PRESENT WITH A CLEAR BACKGROUND (YES/NO): YES YES/NO
KIT LOT #: NORMAL NUMERIC

## 2023-04-26 RX ORDER — MEDROXYPROGESTERONE ACETATE 150 MG/ML
150 INJECTION, SUSPENSION INTRAMUSCULAR ONCE
Status: COMPLETED | OUTPATIENT
Start: 2023-04-26 | End: 2023-04-26

## 2023-04-26 RX ADMIN — MEDROXYPROGESTERONE ACETATE 150 MG: 150 INJECTION, SUSPENSION INTRAMUSCULAR at 09:25:00

## 2023-05-10 ENCOUNTER — OFFICE VISIT (OUTPATIENT)
Dept: INTERNAL MEDICINE CLINIC | Facility: CLINIC | Age: 33
End: 2023-05-10
Payer: COMMERCIAL

## 2023-05-10 VITALS
DIASTOLIC BLOOD PRESSURE: 72 MMHG | HEART RATE: 77 BPM | SYSTOLIC BLOOD PRESSURE: 118 MMHG | OXYGEN SATURATION: 97 % | RESPIRATION RATE: 16 BRPM | HEIGHT: 69 IN | WEIGHT: 293 LBS | BODY MASS INDEX: 43.4 KG/M2

## 2023-05-10 DIAGNOSIS — E66.01 OBESITY, MORBID, BMI 40.0-49.9 (HCC): ICD-10-CM

## 2023-05-10 DIAGNOSIS — Z51.81 THERAPEUTIC DRUG MONITORING: Primary | ICD-10-CM

## 2023-05-10 DIAGNOSIS — E55.9 VITAMIN D DEFICIENCY: ICD-10-CM

## 2023-05-10 DIAGNOSIS — E78.6 LOW HDL (UNDER 40): ICD-10-CM

## 2023-05-10 PROCEDURE — 3074F SYST BP LT 130 MM HG: CPT | Performed by: NURSE PRACTITIONER

## 2023-05-10 PROCEDURE — 3078F DIAST BP <80 MM HG: CPT | Performed by: NURSE PRACTITIONER

## 2023-05-10 PROCEDURE — 3008F BODY MASS INDEX DOCD: CPT | Performed by: NURSE PRACTITIONER

## 2023-05-10 PROCEDURE — 99214 OFFICE O/P EST MOD 30 MIN: CPT | Performed by: NURSE PRACTITIONER

## 2023-05-10 NOTE — PATIENT INSTRUCTIONS
Next steps:  1. Fill your prescribed medication and take as discussed and prescribed: wegovy 0.5mg weekly x 2 more weeks and then increase to 1mg weekly   2. Schedule a personal nutrition consultation with one of our registered dieticians       1. Drink water with meals and throughout the day, cut down on soda and/or juice if consumed. Consider flavored water options like Bubbly, Spindrift, Hint and Anuj. 2.  Eat breakfast daily and focus on having protein with each meal, examples include: greek yogurt, cottage cheese, hard boiled egg, whole grain toast with peanut butter. 3.  Reduce refined carbohydrates and sugars which includes items such as sweets, as well as rice, pasta, and bread and make sure to choose whole grain options when having them with just 1 serving per meal about the size of your inner palm. 4.  Consume non starchy veggies daily working towards making them a good 50% of your daily food intake. Add them to lunch and dinner consistently. 5.  Start a daily probiotic: VSL#3 is recommended, (order on line at www.vsl3. com). Take 1 capsule daily with water for 30 days, then reduce to 1 every other day (this will reduce the cost). Capsules can be left out for 2 weeks, but then must be refrigerated. Please download saw My Fitness Katharithae Cutting! Or Net Diary to monitor daily dietary intake and you will be able to see if you are eating the right amount of calories or too much or too little which would hinder weight loss. Additionally this will help to see your daily carbohydrate and protein intake. When you set the saw up choose 1-2 lbs/week as a goal.  Keeping a paper food journal is an option as well to remain accountable for your choices- this is the start to mindful eating! A low calorie diet has been consistently shown to support weight loss. Continue or start exercising to help establish a routine.  If not already exercising begin with 1 day and progress as able with long-term goal of 30 minutes 5 days a week at a minimum. Meditation daily can help manage and control stress. Chronic stress can make weight loss difficult. Exercising is one way to help with stress, but meditation using the CALM Arleth or another comparable alternative can be done in your home or place of work with little time commitment. This Arleth can also help work on behavior change and improve sleep. Check out the segment under Calm Masterclass and listen to The 4 Pillars of Health. A great way to begin learning about the foundation of lifestyle with practical tips to use in your every day. Check out www.yourweightmatters. org blog for continued daily support and education along this weight loss journey! Patient Resources:     Personal Training/Fitness Classes/Health Coaching     Tosin Amaya and Montoya Sophvic @ [email or web address]/ Full fitness center with group fitness and personal training. Discount available as client of [facility]. Health Coaching and Personal Training with Yaneli Clark at our [facility]- individual weekly coaching with option to add personal training and small group fitness classes targeted at weight loss- [phone] and/or email @ [email or web address]  360FIT [place] [email or web address] Group Fitness [phone] and/or email Sriram Faustin at [email or web address]. [email or web address]  [address] [facility] with multiple locations: Aetna (www.Selero), Eat The HackerHAND Fitness (www.SincroPool. Urakkamaailma.fi), Fit Body Bootcamp (www.LineHopbodybootcamp.Urakkamaailma.fi), AJAX [address] (www.Traffline. Urakkamaailma.fi), The Exercise  (www.exercisecoach.Urakkamaailma.fi)     Online Fitness  Fitness  on Whole Foods in 10 DVD series- www. fldbv23IKB. Urakkamaailma.fi  Sit and Be Fit - Chair exercise series Www.sitandbefit. org  Hip Hop Fit with Denver Hall at www.hiphopfit. net     Apps for on the Speed Dating by Chantilly Lace 7 Minute Workout (orange box with white 7) - free on the go HIIT training arleth  Peloton Arleth @ www. Innovaci     Nutrition Trackers and Tools  LoseIT! And My Fitness Pal apps and on line for tracking nutrition  NOOM - virtual health coaching  FitFoundation (healthy meals on the go) in [facility] @ www. zmjfxblzyhodm6j. Destiney Vaughn MD @ www.Lifestreamsd.com and Maykel Vazquez (keto and low carb plans recommended) @ www. PYWMMM48.STP, Metabolic Meals @ www. Mirage NetworksMetabolicMeals. com - individual prepared meals to go  Play It Gaming, RedShelf, International Business Machines, Every Plate, Coupons.com- on line meal delivery programs for preparation at home  [place] Tasty Labs in [place] for homemade meals to go @ www.mealTransactis. LegalGuru  Diet Doctor @ www. dietdoctor. com - low carb swaps  YummTinyTap - meal prep and planning arleth (www.yummly. com)     Stress Management/Behavior/Mindful Eating  CALM meditation arleth (www.Macaw)  Headspace  Am I Hungry? Mindful eating virtual  arleth  Www.yourweightmatters. org - Obesity Action Coalition sponsored Blog posts daily  Motivation arleth (black box with white \")- daily supportive messages sent to your phone     Books/Video Education/Podcasts  Mindless Eating by Sergio Valdez  Why We Get Sick by Kailey Elise (a book about insulin resistance)  Atomic Habits by Sue Cabrera (a book about taking small steps to promote greater behavior change)   Can't Hurt Me by Jakub Serrano (a book exploring the power of discipline in achieving your goals)  The End of Dieting: How to Live for Life by Dr. Syed Griffin M.D. or listen to The [address] [facility] Episode 61: Understanding \"Nutritarian\" Eating w/Dr. Syed Griffin  Your Body in Balance: The World Fuel Services Corporation of Food, Hormones, and Health by Dr. Valdovinos Hem  The Menopause Diet Plan by Laya Lam and [facility] HOSP AT [facility]  The Complete Guide to fasting by Dr. Anne Mak, [address] [facility] by Sincere Savage, Ph.D, R.D.   Weight Loss Surgery Will Not Treat Food Addiction by Lashaun Levy Ph.D  The [address] [facility] on plant based nutrition  Fed Up - documentary about obesity (Free on Utube)  The Truth About Sugar - documentary on sugar (Free on Utube, https://youtu. be/4R5wjngHA0t)  The Dr. Cadena Minium by Dr. Agustina Maldonado MD  Fitlosophy Fitspiration - journal to better health (found at [employer] in fitness aisle)  What Happened to You?- a look at the impact trauma has on behavior written by Laura Maharaj and Dr. Lexy Mchugh Again by Tiffanie Campoverde - discovering your true self after trauma  Oddis Sprain talk on GreenMantra Technologies, The Call to Courage  Podcasts: The Exam Room by the Physician's Committee, Nutrition Facts by Dr. Amado Cheatham    We are here to support you with weight loss, but please remember that you still need your primary care provider for your routine health maintenance.

## 2023-07-11 NOTE — TELEPHONE ENCOUNTER
Requesting   Requested Prescriptions     Pending Prescriptions Disp Refills    semaglutide-weight management 1.7 MG/0.75ML Subcutaneous Solution Auto-injector 3 mL 1     Sig: Inject 0.75 mL (1.7 mg total) into the skin once a week.      LOV: 5/10/23  RTC: 7 weeks  Filled: 6/12/23 #3 with 1 refills    Future Appointments   Date Time Provider Dakotah Willams   7/14/2023  9:30 AM EMG OB MOB NURSE EMG OB/GYN M [facility] [place]   7/27/2023  8:40 AM Sunshine Mclaughlin MD EMGWEI EM[facility]     Refill too early

## 2023-07-14 ENCOUNTER — NURSE ONLY (OUTPATIENT)
Facility: CLINIC | Age: 33
End: 2023-07-14
Payer: COMMERCIAL

## 2023-07-14 VITALS
BODY MASS INDEX: 43.4 KG/M2 | SYSTOLIC BLOOD PRESSURE: 124 MMHG | WEIGHT: 293 LBS | HEIGHT: 69 IN | DIASTOLIC BLOOD PRESSURE: 72 MMHG | HEART RATE: 88 BPM

## 2023-07-14 DIAGNOSIS — Z30.42 ENCOUNTER FOR DEPO-PROVERA CONTRACEPTION: Primary | ICD-10-CM

## 2023-07-14 PROCEDURE — 3074F SYST BP LT 130 MM HG: CPT | Performed by: OBSTETRICS & GYNECOLOGY

## 2023-07-14 PROCEDURE — 3078F DIAST BP <80 MM HG: CPT | Performed by: OBSTETRICS & GYNECOLOGY

## 2023-07-14 PROCEDURE — 3008F BODY MASS INDEX DOCD: CPT | Performed by: OBSTETRICS & GYNECOLOGY

## 2023-07-14 PROCEDURE — 96372 THER/PROPH/DIAG INJ SC/IM: CPT | Performed by: OBSTETRICS & GYNECOLOGY

## 2023-07-14 RX ORDER — MEDROXYPROGESTERONE ACETATE 150 MG/ML
150 INJECTION, SUSPENSION INTRAMUSCULAR ONCE
Status: COMPLETED | OUTPATIENT
Start: 2023-07-14 | End: 2023-07-14

## 2023-07-14 RX ORDER — MEDROXYPROGESTERONE ACETATE 150 MG/ML
150 INJECTION, SUSPENSION INTRAMUSCULAR
COMMUNITY
End: 2023-07-14

## 2023-07-14 RX ADMIN — MEDROXYPROGESTERONE ACETATE 150 MG: 150 INJECTION, SUSPENSION INTRAMUSCULAR at 10:34:00

## 2023-08-15 DIAGNOSIS — E66.01 OBESITY, MORBID, BMI 40.0-49.9 (HCC): Primary | ICD-10-CM

## 2023-08-16 NOTE — TELEPHONE ENCOUNTER
Requesting   Requested Prescriptions     Pending Prescriptions Disp Refills    semaglutide-weight management 1.7 MG/0.75ML Subcutaneous Solution Auto-injector 3 mL 1     Sig: Inject 0.75 mL (1.7 mg total) into the skin once a week.      LOV: 5/10/23  RTC: 7 weeks  Filled: 6/12/23 #3 with 1 refills    Future Appointments   Date Time Provider Dakotah Willams   9/7/2023  8:40 AM POLI Meier EMGWEI [id number]   10/6/2023  9:30 AM EMG OB MOB NURSE EMG OB/GYN M [facility] [place]

## 2023-09-07 ENCOUNTER — OFFICE VISIT (OUTPATIENT)
Dept: INTERNAL MEDICINE CLINIC | Facility: CLINIC | Age: 33
End: 2023-09-07
Payer: COMMERCIAL

## 2023-09-07 VITALS
OXYGEN SATURATION: 96 % | BODY MASS INDEX: 43.4 KG/M2 | HEART RATE: 88 BPM | DIASTOLIC BLOOD PRESSURE: 76 MMHG | RESPIRATION RATE: 18 BRPM | SYSTOLIC BLOOD PRESSURE: 122 MMHG | WEIGHT: 293 LBS | HEIGHT: 69 IN

## 2023-09-07 DIAGNOSIS — E66.01 OBESITY, MORBID, BMI 40.0-49.9 (HCC): ICD-10-CM

## 2023-09-07 DIAGNOSIS — Z51.81 THERAPEUTIC DRUG MONITORING: Primary | ICD-10-CM

## 2023-09-07 DIAGNOSIS — E55.9 VITAMIN D DEFICIENCY: ICD-10-CM

## 2023-09-07 DIAGNOSIS — E78.6 LOW HDL (UNDER 40): ICD-10-CM

## 2023-09-07 PROCEDURE — 99213 OFFICE O/P EST LOW 20 MIN: CPT | Performed by: NURSE PRACTITIONER

## 2023-09-07 PROCEDURE — 3074F SYST BP LT 130 MM HG: CPT | Performed by: NURSE PRACTITIONER

## 2023-09-07 PROCEDURE — 3078F DIAST BP <80 MM HG: CPT | Performed by: NURSE PRACTITIONER

## 2023-09-07 PROCEDURE — 3008F BODY MASS INDEX DOCD: CPT | Performed by: NURSE PRACTITIONER

## 2023-09-07 NOTE — PATIENT INSTRUCTIONS
Next steps:  1. Fill your prescribed medication and take as discussed and prescribed: wegovy 2.4mg weekly   2. Schedule a personal nutrition consultation with one of our registered dieticians     Please try to work on the following dietary changes:  Daily protein recommendation to start:  grams  Daily carbohydrate: <170g  Daily calories: 2,000  1. Drink water with meals and throughout the day, cut down on soda and/or juice if consumed. Consider flavored water options like Bubbly, Spindrift, Hint and Anuj. 2.  Eat breakfast daily and focus on having protein with each meal, examples include: greek yogurt, cottage cheese, hard boiled egg, whole grain toast with peanut butter. 3.  Reduce refined carbohydrates and sugars which includes items such as sweets, as well as rice, pasta, and bread and make sure to choose whole grain options when having them with just 1 serving per meal about the size of your inner palm. 4.  Consume non starchy veggies daily working towards making them a good 50% of your daily food intake. Add them to lunch and dinner consistently. 5.  Start a daily probiotic: VSL#3 is recommended, (order on line at www.vsl3. com). Take 1 capsule daily with water for 30 days, then reduce to 1 every other day (this will reduce the cost). Capsules can be left out for 2 weeks, but then must be refrigerated. Please download saw My Fitness Orvel Dubin! Or Net Diary to monitor daily dietary intake and you will be able to see if you are eating the right amount of calories or too much or too little which would hinder weight loss. Additionally this will help to see your daily carbohydrate and protein intake. When you set the saw up choose 1-2 lbs/week as a goal.  Keeping a paper food journal is an option as well to remain accountable for your choices- this is the start to mindful eating! A low calorie diet has been consistently shown to support weight loss.      Continue or start exercising to help establish a routine. If not already exercising begin with 1 day and progress as able with long-term goal of 30 minutes 5 days a week at a minimum. Meditation daily can help manage and control stress. Chronic stress can make weight loss difficult. Exercising is one way to help with stress, but meditation using the CALM Arleth or another comparable alternative can be done in your home or place of work with little time commitment. This Arleth can also help work on behavior change and improve sleep. Check out the segment under Calm Masterclass and listen to The 4 Pillars of Health. A great way to begin learning about the foundation of lifestyle with practical tips to use in your every day. Check out www.yourweightmatters. org blog for continued daily support and education along this weight loss journey! Patient Resources:     Personal Training/Fitness Classes/Health Coaching     [facility] and [place] @ [email or web address]/ Full fitness center with group fitness and personal training. Discount available as client of [facility]. Health Coaching and Personal Training with Christopher Daugherty at our [facility]- individual weekly coaching with option to add personal training and small group fitness classes targeted at weight loss- [phone] and/or email @ [email or web address]  360FIT [place] [email or web address] Group Fitness [phone] and/or email Mino Estevez at [email or web address]  [address] [facility] with multiple locations: Aetna (www.Pyreos), Eat The Frog Fitness (www.Honestly.com. Aeropost), Fit Body Bootcamp (www.SportXastbodybootcamp.Aeropost), Cardo Medical Fitness (www.Giftiki. Aeropost), The Exercise  (www.exercisecoach.Aeropost)     Online Fitness  Fitness  on Whole Foods in 10 DVD series- www. didaf95DLW. Aeropost  Sit and Be Fit - Chair exercise series Www.sitandbefit. org  Hip Hop Fit with Denver Hall at Evrent     Apps for on the [employer] 7 Minute Workout (orange box with white 7) - free on the go HIIT training arleth  Peloton Arleth @ www. 72798.com     Nutrition Trackers and Tools  LoseIT! And My Fitness Pal apps and on line for tracking nutrition  NOOM - virtual health coaching  FitFoundation (healthy meals on the go) in [facility] @ www. eowjtrtoupiev7l. Raphael Porter MD @ www.Dashi IntelligencetrSkyRecon SystemsdNeurOp and Vin Bailey (keto and low carb plans recommended) @ www. [email or web address], Metabolic Meals @ www. MyMetabolicMeals. com - individual prepared meals to go  Global One Financial, Locaid, International Business Machines, Every Plate, Libra Entertainment- on line meal delivery programs for preparation at home  [employer] in [place] for homemade meals to go @ www.OopsLab. Cass Art  Diet Doctor @ www. dietdoctor. Cass Art - low carb swaps  Yummly - meal prep and planning arleth (www.yummly. com)     Stress Management/Behavior/Mindful Eating  CALM meditation arleth (www.Evergig)  Headspace  Am I Hungry? Mindful eating virtual  arleth  Www.yourweightmatters. org - Obesity Action Coalition sponsored Blog posts daily  Motivation arleth (black box with white \")- daily supportive messages sent to your phone     Books/Video Education/Podcasts  Mindless Eating by Elvis Kendrick  Why We Get Sick by Christy Bowen (a book about insulin resistance)  Atomic Habits by Osmin Felix (a book about taking small steps to promote greater behavior change)   Can't Hurt Me by Jonna Blackman (a book exploring the power of discipline in achieving your goals)  The End of Dieting: How to Live for Life by Dr. Maria Elena Gandara M.D. or listen to The [address] Episode 61: Understanding \"Nutritarian\" Eating w/Dr. Maria Elena Gandara  Your Body in Balance: The World Fuel Services Corporation of Food, Hormones, and Health by Dr. Beto Castro  The Menopause Diet Plan by Taisha Villagomez and [facility]OSP AT [facility]  The Complete Guide to fasting by Dr. Светлана Bueno, [address] [facility] by Jana Gaytan, Ph.D, R.D.   Weight Loss Surgery Will Not Treat Food Addiction by Yury Perdomo Ph.D  The [address] [place] [address] on plant based nutrition  Fed Up - documentary about obesity (Free on [place])  The Truth About Sugar - documentary on sugar (Free on Utube, https://youtu. be/1F6ksxxVV5h)  The Dr. Lesly Rivers by Dr. Rah Pierre MD  Fitlosophy Fitspiration - journal to better health (found at [employer] in fitness aisle)  What Happened to You?- a look at the impact trauma has on behavior written by Charlie Palomo and Dr. Pamela Schmidt Again by Janis Trotter - discovering your true self after trauma  Susen Osgood talk on Strauss Technology, The Call to Courage  Podcasts: The Exam Room by the Physician's Committee, Nutrition Facts by Dr. Freddy Polo    We are here to support you with weight loss, but please remember that you still need your primary care provider for your routine health maintenance.

## 2023-10-05 ENCOUNTER — TELEPHONE (OUTPATIENT)
Facility: CLINIC | Age: 33
End: 2023-10-05

## 2023-10-05 NOTE — TELEPHONE ENCOUNTER
Called pt to let her know she is overdue for her WWE and needs to schedule before getting her next depo shot. Let pt know in  there is openings to see TK and get both done and to call the office back to reschedule.

## 2023-10-09 DIAGNOSIS — E66.01 OBESITY, MORBID, BMI 40.0-49.9 (HCC): ICD-10-CM

## 2023-10-09 NOTE — TELEPHONE ENCOUNTER
Requesting   Requested Prescriptions     Pending Prescriptions Disp Refills    semaglutide-weight management 2.4 MG/0.75ML Subcutaneous Solution Auto-injector 3 mL 1     Sig: Inject 0.75 mL (2.4 mg total) into the skin once a week.      LOV: 9/7/23  RTC: 3 months  Filled: 8/16/23 #3 with 1 refills    Future Appointments   Date Time Provider Dakotah Willams   10/17/2023 11:15 AM POLI Webb [facility] [place]   12/12/2023  8:40 AM Mario Arias APRN [facility]

## 2023-10-17 ENCOUNTER — OFFICE VISIT (OUTPATIENT)
Facility: CLINIC | Age: 33
End: 2023-10-17
Payer: COMMERCIAL

## 2023-10-17 VITALS
DIASTOLIC BLOOD PRESSURE: 80 MMHG | HEIGHT: 69 IN | SYSTOLIC BLOOD PRESSURE: 118 MMHG | WEIGHT: 293 LBS | BODY MASS INDEX: 43.4 KG/M2 | HEART RATE: 86 BPM

## 2023-10-17 DIAGNOSIS — Z01.419 ENCOUNTER FOR WELL WOMAN EXAM WITH ROUTINE GYNECOLOGICAL EXAM: Primary | ICD-10-CM

## 2023-10-17 DIAGNOSIS — Z01.812 PRE-PROCEDURAL LABORATORY EXAMINATION: ICD-10-CM

## 2023-10-17 DIAGNOSIS — Z12.4 CERVICAL CANCER SCREENING: ICD-10-CM

## 2023-10-17 DIAGNOSIS — Z30.42 ENCOUNTER FOR MANAGEMENT AND INJECTION OF DEPO-PROVERA: ICD-10-CM

## 2023-10-17 PROCEDURE — 3008F BODY MASS INDEX DOCD: CPT

## 2023-10-17 PROCEDURE — 96372 THER/PROPH/DIAG INJ SC/IM: CPT

## 2023-10-17 PROCEDURE — 3079F DIAST BP 80-89 MM HG: CPT

## 2023-10-17 PROCEDURE — 87624 HPV HI-RISK TYP POOLED RSLT: CPT

## 2023-10-17 PROCEDURE — 81025 URINE PREGNANCY TEST: CPT

## 2023-10-17 PROCEDURE — 3074F SYST BP LT 130 MM HG: CPT

## 2023-10-17 PROCEDURE — 99395 PREV VISIT EST AGE 18-39: CPT

## 2023-10-17 RX ORDER — MEDROXYPROGESTERONE ACETATE 150 MG/ML
150 INJECTION, SUSPENSION INTRAMUSCULAR ONCE
Status: COMPLETED | OUTPATIENT
Start: 2023-10-17 | End: 2023-10-17

## 2023-10-17 RX ADMIN — MEDROXYPROGESTERONE ACETATE 150 MG: 150 INJECTION, SUSPENSION INTRAMUSCULAR at 11:55:00

## 2023-10-17 NOTE — PROGRESS NOTES
Junior Vega is a 29year old female [id number] Patient's last menstrual period was 10/10/2023 (approximate). Patient presents with:  Wellness Visit: Last pap 17 neg/neg  Other: Pt declined flu shot  Student okay to come in  .     OBSTETRICS HISTORY:  OB History    Para Term  AB Living   2 1 0 1 1 2   SAB IAB Ectopic Multiple Live Births   0 0 1 1 2      # Outcome Date GA Lbr Kurt/2nd Weight Sex Delivery Anes PTL Lv   2A  19 25w1d  1 lb 11 oz (0.765 kg) F CS-LTranv Gen Y SUSANNE      Complications: Variable decelerations   2B  19 25w1d  1 lb 8.7 oz (0.7 kg) F CS-LTranv Gen Y SUSANNE   1 Ectopic 2014 5w4d              GYNE HISTORY:  Periods regular monthly    Sexual activity:   Yes      Partners:   Male      Birth control/ protection:   Depo Provera        Hx Prior Abnormal Pap: No  Pap Date: 17  Pap Result Notes: negative        MEDICAL HISTORY:  Past Medical History:   Diagnosis Date    Anemia     Hypothyroidism     Incompetent cervix in pregnancy, antepartum, second trimester 2018    Ovarian hyperstimulation syndrome 2018    PCOS (polycystic ovarian syndrome)        SURGICAL HISTORY:  Past Surgical History:   Procedure Laterality Date    Follicle punc,retrieval of oocyte      Other surgical history      ectopic pregnancy       SOCIAL HISTORY:  Social History    Socioeconomic History      Marital status:       Spouse name: Not on file      Number of children: Not on file      Years of education: Not on file      Highest education level: Not on file    Occupational History      Not on file    Tobacco Use      Smoking status: Never      Smokeless tobacco: Never    Vaping Use      Vaping Use: Never used    Substance and Sexual Activity      Alcohol use: No      Drug use: No      Sexual activity: Yes        Partners: Male        Birth control/protection: Depo Provera    Other Topics      Concerns:         Service: Not Asked        Blood Transfusions: Not Asked        Caffeine Concern: No          coffee daily        Occupational Exposure: Not Asked        Hobby Hazards: Not Asked        Sleep Concern: Not Asked        Stress Concern: Not Asked        Weight Concern: Not Asked        Special Diet: Not Asked        Back Care: Not Asked        Exercise: Yes          walk 3 x a weel        Bike Helmet: Not Asked        Seat Belt: Yes        Self-Exams: Yes    Social History Narrative      Not on file    Social Determinants of Health  Financial Resource Strain: Not on file  Food Insecurity: Not on file  Transportation Needs: Not on file  Physical Activity: Not on file  Stress: Not on file  Social Connections: Not on file  Housing Stability: Not on file    FAMILY HISTORY:  Family History   Problem Relation Age of Onset    High Blood Pressure Mother     Diabetes Father     High Blood Pressure Father     Heart Disease Father     Heart Surgery Father     No Known Problems Maternal Grandmother     No Known Problems Maternal Grandfather     No Known Problems Paternal Grandmother     No Known Problems Paternal Grandfather     Other (Other) Daughter          shunt    Cancer Maternal Aunt         breast    Breast Cancer Maternal Aunt        MEDICATIONS:    Current Outpatient Medications:     semaglutide-weight management 2.4 MG/0.75ML Subcutaneous Solution Auto-injector, Inject 0.75 mL (2.4 mg total) into the skin once a week., Disp: 3 mL, Rfl: 2    IRON OR, Take 1 tablet by mouth daily.   , Disp: , Rfl:     Multiple Vitamin (MULTI-VITAMIN DAILY) Oral Tab, Take by mouth., Disp: , Rfl:     ALLERGIES:    Dog Dander [Dander]     RASH  Pollen                  RASH      Review of Systems:  Constitutional:  Denies fatigue, night sweats, hot flashes  Eyes:  denies blurred or double vision  Cardiovascular:  denies chest pain or palpitations  Respiratory:  denies shortness of breath  Gastrointestinal:  denies heartburn, abdominal pain, diarrhea or constipation  Genitourinary:  denies dysuria, incontinence, abnormal vaginal discharge, vaginal itching  Musculoskeletal:  denies back pain. Skin/Breast:  Denies any breast pain, lumps, or discharge. Neurological:  denies headaches, extremity weakness or numbness. Psychiatric: denies depression or anxiety. Endocrine:   denies excessive thirst or urination. Heme/Lymph:  denies history of anemia, easy bruising or bleeding. PHYSICAL EXAM:   Constitutional: well developed, well nourished  Head/Face: normocephalic  Neck/Thyroid: thyroid symmetric, no thyromegaly, no nodules, no adenopathy  Lymphatic:no abnormal supraclavicular or axillary adenopathy is noted  Breast: normal without palpable masses, tenderness, asymmetry, nipple discharge, nipple retraction or skin changes  Abdomen:  soft, nontender, nondistended, no masses  Skin/Hair: no unusual rashes or bruises  Extremities: no edema, no cyanosis  Psychiatric:  Oriented to time, place, person and situation. Appropriate mood and affect    Pelvic Exam:  External Genitalia: normal appearance, hair distribution, and no lesions  Urethral Meatus:  normal in size, location, without lesions and prolapse  Bladder:  No fullness, masses or tenderness  Vagina:  Normal appearance without lesions, no abnormal discharge  Cervix:  Normal without tenderness on motion  Uterus: normal in size, contour, position, mobility, without tenderness  Adnexa: normal without masses or tenderness  Perineum: normal  Anus: no hemorroids     Assessment & Plan:  Diagnoses and all orders for this visit:    Encounter for well woman exam with routine gynecological exam    Pre-procedural laboratory examination  -     Urine Preg Test [69053]    Cervical cancer screening  -     Hpv High Risk , Thin Prep Collect;  Future  -     ThinPrep PAP Smear B; Future    Encounter for management and injection of depo-Provera  -     medroxyPROGESTERone (Depo-Provera) 150 MG/ML injection 150 mg

## 2023-10-18 LAB — HPV I/H RISK 1 DNA SPEC QL NAA+PROBE: NEGATIVE

## 2023-10-23 LAB
.: NORMAL
.: NORMAL

## 2023-11-30 ENCOUNTER — TELEPHONE (OUTPATIENT)
Dept: INTERNAL MEDICINE CLINIC | Facility: CLINIC | Age: 33
End: 2023-11-30

## 2023-11-30 ENCOUNTER — TELEPHONE (OUTPATIENT)
Dept: FAMILY MEDICINE CLINIC | Facility: CLINIC | Age: 33
End: 2023-11-30

## 2023-11-30 DIAGNOSIS — Z00.00 ROUTINE GENERAL MEDICAL EXAMINATION AT A HEALTH CARE FACILITY: Primary | ICD-10-CM

## 2023-11-30 NOTE — TELEPHONE ENCOUNTER
Humphrey Pina (Hughes: [id number])  Rx #: [id number]  QGQCMC 2.4MG/0.75ML auto-injectors  started to enter in [facility]  Awaiting questions.

## 2023-11-30 NOTE — TELEPHONE ENCOUNTER
[facility] sent request to complete PA for wegovy 2.4 mg on [facility]  [id number]    NEED TO ENTER

## 2023-12-03 NOTE — TELEPHONE ENCOUNTER
Entered PA for Wegovy 2.4 mg in [facility]  Awaiting decision  [facility] (Key: [id number])

## 2023-12-12 ENCOUNTER — OFFICE VISIT (OUTPATIENT)
Dept: INTERNAL MEDICINE CLINIC | Facility: CLINIC | Age: 33
End: 2023-12-12
Payer: COMMERCIAL

## 2023-12-12 VITALS
RESPIRATION RATE: 18 BRPM | HEIGHT: 69 IN | HEART RATE: 72 BPM | SYSTOLIC BLOOD PRESSURE: 114 MMHG | DIASTOLIC BLOOD PRESSURE: 70 MMHG | WEIGHT: 287 LBS | BODY MASS INDEX: 42.51 KG/M2

## 2023-12-12 DIAGNOSIS — E78.6 LOW HDL (UNDER 40): ICD-10-CM

## 2023-12-12 DIAGNOSIS — E55.9 VITAMIN D DEFICIENCY: ICD-10-CM

## 2023-12-12 DIAGNOSIS — Z51.81 THERAPEUTIC DRUG MONITORING: Primary | ICD-10-CM

## 2023-12-12 DIAGNOSIS — E66.01 OBESITY, MORBID, BMI 40.0-49.9 (HCC): ICD-10-CM

## 2023-12-12 PROCEDURE — 3008F BODY MASS INDEX DOCD: CPT | Performed by: NURSE PRACTITIONER

## 2023-12-12 PROCEDURE — 3078F DIAST BP <80 MM HG: CPT | Performed by: NURSE PRACTITIONER

## 2023-12-12 PROCEDURE — 3074F SYST BP LT 130 MM HG: CPT | Performed by: NURSE PRACTITIONER

## 2023-12-12 PROCEDURE — 99213 OFFICE O/P EST LOW 20 MIN: CPT | Performed by: NURSE PRACTITIONER

## 2023-12-12 NOTE — PATIENT INSTRUCTIONS
Next steps:  1. Fill your prescribed medication and take as discussed and prescribed: wegovy 2.4mg weekly   2. Schedule a personal nutrition consultation with one of our registered dieticians     Please try to work on the following dietary changes:    1. Drink water with meals and throughout the day, cut down on soda and/or juice if consumed. Consider flavored water options like Bubbly, Spindrift, Hint and Anuj. 2.  Eat breakfast daily and focus on having protein with each meal, examples include: greek yogurt, cottage cheese, hard boiled egg, whole grain toast with peanut butter. 3.  Reduce refined carbohydrates and sugars which includes items such as sweets, as well as rice, pasta, and bread and make sure to choose whole grain options when having them with just 1 serving per meal about the size of your inner palm. 4.  Consume non starchy veggies daily working towards making them a good 50% of your daily food intake. Add them to lunch and dinner consistently. 5.  Start a daily probiotic: VSL#3 is recommended, (order on line at www.vsl3. com). Take 1 capsule daily with water for 30 days, then reduce to 1 every other day (this will reduce the cost). Capsules can be left out for 2 weeks, but then must be refrigerated. Please download saw My Fitness Suzanne Coon! Or Net Diary to monitor daily dietary intake and you will be able to see if you are eating the right amount of calories or too much or too little which would hinder weight loss. Additionally this will help to see your daily carbohydrate and protein intake. When you set the saw up choose 1-2 lbs/week as a goal.  Keeping a paper food journal is an option as well to remain accountable for your choices- this is the start to mindful eating! A low calorie diet has been consistently shown to support weight loss. Continue or start exercising to help establish a routine.  If not already exercising begin with 1 day and progress as able with long-term goal of 30 minutes 5 days a week at a minimum. Meditation daily can help manage and control stress. Chronic stress can make weight loss difficult. Exercising is one way to help with stress, but meditation using the CALM Arleth or another comparable alternative can be done in your home or place of work with little time commitment. This Arleth can also help work on behavior change and improve sleep. Check out the segment under Calm Masterclass and listen to The 4 Pillars of Health. A great way to begin learning about the foundation of lifestyle with practical tips to use in your every day. Check out www.yourweightmatters. org blog for continued daily support and education along this weight loss journey! Patient Resources:     Personal Training/Fitness Classes/Health Coaching     Tosin Amaya and [place] @ [email or web address]/ Full fitness center with group fitness and personal training. Discount available as client of [facility]. Health Coaching and Personal Training with Ismael Ramos at our [employer]- individual weekly coaching with option to add personal training and small group fitness classes targeted at weight loss- [phone] and/or email @ [email or web address] [email or web address]  360FIT [place] [email or web address] Group Fitness [phone] and/or email Madalyn Alvarez at [email or web address]  [address] [facility] with multiple locations: Aetna (www.Websupport. TheVegibox.com), Eat The Featurespace Fitness (www.Advanced Mem-Tech. TheVegibox.com), Fit Body Bootcamp (www.Avere Systemsbodybootcamp.TheVegibox.com), Bootstrap Digital and Tech Ventures Inc. (www.M2 Digital Limited. TheVegibox.com), The Exercise  (www.exercisecoach.TheVegibox.com)     Online Fitness  Fitness  on Whole Foods in 10 DVD series- www. wadvh14MRF. TheVegibox.com  Sit and Be Fit - Chair exercise series Www.sitandbefit. org  Hip Hop Fit with Denver Hall at www.hiphopfit. net     Apps for on the [employer] 7 Minute Workout (orange box with white 7) - free on the go HIIT training arleth  Peloton Arleth @ www. Tellyo     Nutrition Trackers and Tools  LoseIT! And My Fitness Pal apps and on line for tracking nutrition  NOOM - virtual health coaching  FitFoundation (healthy meals on the go) in [facility] @ www. vxeczpfcfbbwu2f. Kalen Rosen MD @ www.I-WorksdStormPins and Gwenith Goldberg (keto and low carb plans recommended) @ www. WXDBUC19.VKR, Metabolic Meals @ www. MyMetabolicMeals. com - individual prepared meals to go  Logical Lighting, Red Rabbit inc, International Business Machines, Every Plate, Spotcast Inc.- on line meal delivery programs for preparation at home  AK [employer] in [place] for homemade meals to go @ wwwEnohm. CarZen  Diet Doctor @ www. dietdoctor. CarZen - low carb swaps  Synercon Technologies - meal prep and planning arleth (www.yummly. com)     Stress Management/Behavior/Mindful Eating  CALM meditation arleth (www.Clickberry)  Headspace  Am I Hungry? Mindful eating virtual  arleth  Www.yourweightmatters. org - Obesity Action Coalition sponsored Blog posts daily  Motivation arleth (black box with white \")- daily supportive messages sent to your phone     Books/Video Education/Podcasts  Mindless Eating by Jocelyn Morales  Why We Get Sick by Bijal Valenzuela (a book about insulin resistance)  Atomic Habits by Dinorah Moise (a book about taking small steps to promote greater behavior change)   Can't Hurt Me by Daphney Sotomayor (a book exploring the power of discipline in achieving your goals)  The End of Dieting: How to Live for Life by Dr. Rah Pittman M.D. or listen to The [address] [facility] [address] Episode 61: Understanding \"Nutritarian\" Eating w/Dr. Rah Pittman  Your Body in Balance: The World Fuel Services Corporation of Food, Hormones, and Health by Dr. Ehsan Villafana  The Menopause Diet Plan by Jerson Farrell and [facility] HOSP AT [facility]  The Complete Guide to fasting by Dr. Yaneth Gutierrez, [address] [facility] by Kenny Camara, Ph.D, R.D.   Weight Loss Surgery Will Not Treat Food Addiction by Cecilia Hernández Ph.D  The Game Changers- Praful Documentary on plant based nutrition  Fed Up - documentary about obesity (Free on Utube)  The Truth About Sugar - documentary on sugar (Free on Utube, https://youtu. be/0S6kfkpFE2c)  The Dr. Segun Nunez by Dr. Dre Rodriguez MD  Fitlosophy Fitspiration - journal to better health (found at [employer] in fitness aisle)  What Happened to You?- a look at the impact trauma has on behavior written by Erasto Vasquez and Dr. Vega Carrier Again by Pamela Marinelli - discovering your true self after trauma  Anastasiya Jordan talk on AllofMe, The Call to [place]  Podcasts: The Exam Room by the Physician's Committee, Nutrition Facts by Dr. Carlton Melendez    We are here to support you with weight loss, but please remember that you still need your primary care provider for your routine health maintenance.

## 2024-01-05 ENCOUNTER — TELEPHONE (OUTPATIENT)
Dept: FAMILY MEDICINE CLINIC | Facility: CLINIC | Age: 34
End: 2024-01-05

## 2024-01-05 NOTE — TELEPHONE ENCOUNTER
Please enter lab orders for the patient's upcoming physical appointment.     Physical scheduled:   Your appointments       Date & Time Appointment Department ([place])    Apr 12, 2024  7:45 AM CDT Adult Physical with Luba Cardoso MD [facility] ([facility])    PLEASE NOTE - Most insurances allow a Complete Physical once every 366 days. Please schedule accordingly.    Please arrive 15 minutes prior to your scheduled appointment. Please also bring your Insurance card, Photo ID, and your medication bottles or a list of your current medication.    If you no longer require this appointment, please contact your physician office to cancel.              [facility] [place]  [address] [address]  [phone]           Preferred lab: [facility] ([facility])     The patient has been notified to complete fasting labs prior to their physical appointment.

## 2024-01-09 ENCOUNTER — NURSE ONLY (OUTPATIENT)
Facility: CLINIC | Age: 34
End: 2024-01-09
Payer: COMMERCIAL

## 2024-01-09 VITALS
HEIGHT: 69 IN | SYSTOLIC BLOOD PRESSURE: 116 MMHG | WEIGHT: 285 LBS | DIASTOLIC BLOOD PRESSURE: 76 MMHG | HEART RATE: 78 BPM | BODY MASS INDEX: 42.21 KG/M2

## 2024-01-09 DIAGNOSIS — Z30.42 DEPO-PROVERA CONTRACEPTIVE STATUS: Primary | ICD-10-CM

## 2024-01-09 PROCEDURE — 3074F SYST BP LT 130 MM HG: CPT

## 2024-01-09 PROCEDURE — 3008F BODY MASS INDEX DOCD: CPT

## 2024-01-09 PROCEDURE — 3078F DIAST BP <80 MM HG: CPT

## 2024-01-09 PROCEDURE — 96372 THER/PROPH/DIAG INJ SC/IM: CPT

## 2024-01-09 RX ORDER — MEDROXYPROGESTERONE ACETATE 150 MG/ML
150 INJECTION, SUSPENSION INTRAMUSCULAR
COMMUNITY

## 2024-01-09 RX ORDER — MEDROXYPROGESTERONE ACETATE 150 MG/ML
150 INJECTION, SUSPENSION INTRAMUSCULAR ONCE
Status: COMPLETED | OUTPATIENT
Start: 2024-01-09 | End: 2024-01-09

## 2024-01-09 RX ADMIN — MEDROXYPROGESTERONE ACETATE 150 MG: 150 INJECTION, SUSPENSION INTRAMUSCULAR at 09:29:00

## 2024-02-02 ENCOUNTER — OFFICE VISIT (OUTPATIENT)
Dept: FAMILY MEDICINE CLINIC | Facility: CLINIC | Age: 34
End: 2024-02-02
Payer: COMMERCIAL

## 2024-02-02 VITALS
RESPIRATION RATE: 16 BRPM | TEMPERATURE: 98 F | HEART RATE: 84 BPM | BODY MASS INDEX: 43 KG/M2 | DIASTOLIC BLOOD PRESSURE: 72 MMHG | WEIGHT: 288.63 LBS | SYSTOLIC BLOOD PRESSURE: 110 MMHG

## 2024-02-02 DIAGNOSIS — M54.50 CHRONIC RIGHT-SIDED LOW BACK PAIN WITHOUT SCIATICA: ICD-10-CM

## 2024-02-02 DIAGNOSIS — M25.551 RIGHT HIP PAIN: Primary | ICD-10-CM

## 2024-02-02 DIAGNOSIS — G89.29 CHRONIC RIGHT-SIDED LOW BACK PAIN WITHOUT SCIATICA: ICD-10-CM

## 2024-02-02 PROCEDURE — 3078F DIAST BP <80 MM HG: CPT | Performed by: PHYSICIAN ASSISTANT

## 2024-02-02 PROCEDURE — 99213 OFFICE O/P EST LOW 20 MIN: CPT | Performed by: PHYSICIAN ASSISTANT

## 2024-02-02 PROCEDURE — 3074F SYST BP LT 130 MM HG: CPT | Performed by: PHYSICIAN ASSISTANT

## 2024-02-02 NOTE — PROGRESS NOTES
Chief Complaint   Patient presents with    Back Pain     Hip pain on right side for several months         HISTORY OF PRESENT ILLNESS  Jil High is a 33 year old female who presents for right hip pain. Ongoing for 6+ months. Ongoing daily. Flares 1-2 times per week. Radiates down the lateral leg. 3/10 at minimum, 8/10 worst. No improvement ibuprofen, tylenol, ice, heat. Only feels better with dietary changes. Does feel better with sleeping with support, better shoes. No numbness, weakness, tingling. No change with exercise.     Recently lost 30 lbs on Wegovy. Did not feel any improvement in her back.   Worries as aunt was diagnosed with ovarian cancer and she had pain as her first symptom.        Current Outpatient Medications:     semaglutide-weight management 2.4 MG/0.75ML Subcutaneous Solution Auto-injector, Inject 0.75 mL (2.4 mg total) into the skin once a week., Disp: 9 mL, Rfl: 0    Multiple Vitamin (MULTI-VITAMIN DAILY) Oral Tab, Take by mouth., Disp: , Rfl:     Allergies: Dog dander [dander] and Pollen    Patient Active Problem List   Diagnosis    Iron deficiency anemia    History of  section    Obesity without serious comorbidity    Vitamin D deficiency    Therapeutic drug monitoring    Obesity, morbid, BMI 40.0-49.9 ([facility])       Past Surgical History:   Procedure Laterality Date    Follicle punc,retrieval of oocyte      Other surgical history      ectopic pregnancy       Social History     Socioeconomic History    Marital status:    Tobacco Use    Smoking status: Never    Smokeless tobacco: Never   Vaping Use    Vaping Use: Never used   Substance and Sexual Activity    Alcohol use: No    Drug use: No    Sexual activity: Yes     Partners: Male     Birth control/protection: Depo Provera   Other Topics Concern    Caffeine Concern No     Comment: 2cups of coffee daily    Exercise Yes     Comment: walk 3 x a weel    Seat Belt Yes    Self-Exams Yes         Vitals:    24 1329   BP:  110/72   Pulse: 84   Resp: 16   Temp: 98 °F (36.7 °C)       PHYSICAL EXAM  GENERAL: Alert, relaxed female, under no acute distress.  SKIN: Warm, moist, no lesions or ecchymosis.   MUSCULOSKELETAL:  Back exam  Inspection -  No swelling, erythema or brusing..   Palpation - tender, SI joint bilaterally, L4, L5.  ROM  Flexion - Normal. Extension - Normal. Side bend right - Normal.  Side bend left - Normal.  Strength, resisted knee flexion/extension -  5/5.  Resisted ankle dorsi/plantar flexion -  5/5. Great toe extension - 5/5.   Straight leg raise - Negative .  NEUROVASCULAR:  Gait - Not Antalgic.   Heel Toe Walking - Normal.   Sensation - light touch.   Pulses at dorsalis pedis - 2+. Posterior tibial - 2+.   Capillary refill - Brisk.    ASSESSMENT/ PLAN  1. Right hip pain  2. Chronic right-sided low back pain without sciatica  No red flag signs or symptoms. Recommend pelvic x-ray to evaluate initially. If negative, would recommend that she consider physical therapy. Recommend supportive cares. Follow up for persistent or worsening symptoms.      Patient expresses understanding and agreement with above plan.  Ortega Mendez PA-C

## 2024-02-03 ENCOUNTER — HOSPITAL ENCOUNTER (OUTPATIENT)
Dept: GENERAL RADIOLOGY | Facility: HOSPITAL | Age: 34
Discharge: HOME | End: 2024-02-03
Attending: PHYSICIAN ASSISTANT
Payer: COMMERCIAL

## 2024-02-03 DIAGNOSIS — G89.29 CHRONIC RIGHT-SIDED LOW BACK PAIN WITHOUT SCIATICA: ICD-10-CM

## 2024-02-03 DIAGNOSIS — M54.50 CHRONIC RIGHT-SIDED LOW BACK PAIN WITHOUT SCIATICA: ICD-10-CM

## 2024-02-03 DIAGNOSIS — M25.551 RIGHT HIP PAIN: ICD-10-CM

## 2024-02-03 PROCEDURE — 73502 X-RAY EXAM HIP UNI 2-3 VIEWS: CPT | Performed by: PHYSICIAN ASSISTANT

## 2024-03-11 DIAGNOSIS — E66.01 OBESITY, MORBID, BMI 40.0-49.9 (HCC): ICD-10-CM

## 2024-03-11 DIAGNOSIS — Z51.81 THERAPEUTIC DRUG MONITORING: ICD-10-CM

## 2024-03-12 RX ORDER — SEMAGLUTIDE 2.4 MG/.75ML
0.75 INJECTION, SOLUTION SUBCUTANEOUS WEEKLY
Qty: 9 ML | Refills: 0 | Status: SHIPPED | OUTPATIENT
Start: 2024-03-12

## 2024-03-12 NOTE — TELEPHONE ENCOUNTER
Requesting   Requested Prescriptions     Pending Prescriptions Disp Refills    WEGOVY 2.4 MG/0.75ML Subcutaneous Solution Auto-injector [Pharmacy Med Name: WEGOVY 2.4 MG/0.75 ML PEN]  0     Sig: INJECT 0.75 ML (2.4 MG TOTAL) INTO THE SKIN ONCE A WEEK.     LOV: 12/12/23  RTC: 3 months  Filled: 12/1523 #9 with 0 refills    Future Appointments   Date Time Provider Department Center   4/5/2024 10:00 AM [facility] OB [facility]OB NURSE [facility] OB[facility]N [facility] [place]   4/12/2024  7:45 AM Luba Cardoso MD [facility] 3 [facility] [place]   4/15/2024  4:20 PM Nell Sanabria APRN [facility]

## 2024-04-12 ENCOUNTER — TELEPHONE (OUTPATIENT)
Dept: FAMILY MEDICINE CLINIC | Facility: CLINIC | Age: 34
End: 2024-04-12

## 2024-04-12 NOTE — TELEPHONE ENCOUNTER
Please enter lab orders for the patient's upcoming physical appointment.     Physical scheduled:   Your appointments       Date & Time Appointment Department ([place])    Jun 25, 2024 7:45 AM CDT Adult Physical with Luba Cardoso MD [facility] ([facility])    PLEASE NOTE - Most insurances allow a Complete Physical once every 366 days. Please schedule accordingly.    Please arrive 15 minutes prior to your scheduled appointment. Please also bring your Insurance card, Photo ID, and your medication bottles or a list of your current medication.    If you no longer require this appointment, please contact your physician office to cancel.              [facility] [place]  [address] [address]  [phone]           Preferred lab: [facility] ([facility])     The patient has been notified to complete fasting labs prior to their physical appointment.      Sarina Cuevas is a 26 y.o. female who presents for routine HPV vaccine per verbal order from Mayelin Tomlin MD.  She denies any symptoms , reactions or allergies that would exclude them from being immunized today. Risks and adverse reactions were discussed and the VIS was given to her. All questions were addressed. She was observed for 10 min post injection. There were no reactions observed.

## 2024-04-16 ENCOUNTER — OFFICE VISIT (OUTPATIENT)
Dept: INTERNAL MEDICINE CLINIC | Facility: CLINIC | Age: 34
End: 2024-04-16
Payer: COMMERCIAL

## 2024-04-16 VITALS
HEIGHT: 69 IN | DIASTOLIC BLOOD PRESSURE: 80 MMHG | RESPIRATION RATE: 16 BRPM | HEART RATE: 92 BPM | SYSTOLIC BLOOD PRESSURE: 110 MMHG | WEIGHT: 279 LBS | BODY MASS INDEX: 41.32 KG/M2

## 2024-04-16 DIAGNOSIS — E66.01 OBESITY, MORBID, BMI 40.0-49.9 (HCC): ICD-10-CM

## 2024-04-16 DIAGNOSIS — E55.9 VITAMIN D DEFICIENCY: ICD-10-CM

## 2024-04-16 DIAGNOSIS — Z51.81 THERAPEUTIC DRUG MONITORING: Primary | ICD-10-CM

## 2024-04-16 DIAGNOSIS — E78.6 LOW HDL (UNDER 40): ICD-10-CM

## 2024-04-16 PROCEDURE — 3079F DIAST BP 80-89 MM HG: CPT | Performed by: NURSE PRACTITIONER

## 2024-04-16 PROCEDURE — 3008F BODY MASS INDEX DOCD: CPT | Performed by: NURSE PRACTITIONER

## 2024-04-16 PROCEDURE — 3074F SYST BP LT 130 MM HG: CPT | Performed by: NURSE PRACTITIONER

## 2024-04-16 PROCEDURE — 99214 OFFICE O/P EST MOD 30 MIN: CPT | Performed by: NURSE PRACTITIONER

## 2024-04-16 RX ORDER — PHENTERMINE HYDROCHLORIDE 15 MG/1
15 CAPSULE ORAL EVERY MORNING
Qty: 30 CAPSULE | Refills: 2 | Status: SHIPPED | OUTPATIENT
Start: 2024-04-16

## 2024-04-16 NOTE — PROGRESS NOTES
HISTORY OF PRESENT ILLNESS  Chief Complaint   Patient presents with    Weight Check     -8     Jil High is a 33 year old female here for follow up with medical weight loss program for the treatment of overweight, obesity, or morbid obesity.     Down 8 lbs  Compliant on wegovy 2.4mg weekly   Tolerating well, helping with decreasing appetite and no side effects     Still thinking less of food  Fasting for ramandeep (now is the holiday, feels like she needs to get back on track again)  Switched jobs, now has access to gym at work   Overall feels better, is trying to get in more movement in everyday life   Back pain and hip pain has improved with weight loss  Exercise/Activity: 3x/ week, via walking, not doing anything routine as far as exercise (due to fatigue, and back pain)   Nutrition: eating regular meals, +protein, minimal veggies. not tracking reports  Meals out per week on average: 1-2  Stress is manageable   Sleep: 6 hours/night, waking up feeling tired (due to kids illness)     Denies chest pain, shortness of breath, dizziness, blurred vision, headache, paresthesia, nausea/vomiting.     Breakfast Lunch Dinner Snacks Fluids   Reviewed              Wt Readings from Last 6 Encounters:   04/16/24 279 lb (126.6 kg)   02/02/24 288 lb 9.6 oz (130.9 kg)   01/09/24 285 lb (129.3 kg)   12/12/23 287 lb (130.2 kg)   10/17/23 298 lb 12.8 oz (135.5 kg)   09/07/23 298 lb (135.2 kg)          REVIEW OF SYSTEMS  GENERAL: feels well otherwise, denied any fevers chills or night sweats   LUNGS: denies shortness of breath  CARDIOVASCULAR: denies chest pain  GI: denies abdominal pain  MUSCULOSKELETAL:  +back pain (right hip), joint pains   PSYCH: denies change in behavior or mood, denies feeling sad or depressed    EXAM  /80   Pulse 92   Resp 16   Ht 5' 9\" (1.753 m)   Wt 279 lb (126.6 kg)   LMP 01/07/2024 (Exact Date)   BMI 41.20 kg/m²       GENERAL: well developed, well nourished, in no apparent distress, A/O  x3  SKIN: no rashes, no suspicious lesions  HEENT: atraumatic, normocephalic, OP-clear, PERRLA  NECK: supple, no adenopathy  LUNGS: CTA in all fields, breathing non labored  CARDIO: RRR without murmur  GI: +BS, NT/ND, no masses or HSM  EXTREMITIES: no cyanosis, no clubbing, no edema    Lab Results   Component Value Date     (H) 04/18/2023    BUN 11 04/18/2023    BUNCREA 12.4 12/27/2019    CREATSERUM 0.99 04/18/2023    ANIONGAP 4 04/18/2023    GFRNAA 89 09/22/2021    GFRAA 103 09/22/2021    CA 8.8 04/18/2023    OSMOCALC 286 04/18/2023    ALKPHO 73 04/18/2023    AST 23 04/18/2023    ALT 34 04/18/2023    BILT 0.2 04/18/2023    TP 7.5 04/18/2023    ALB 3.4 04/18/2023    GLOBULIN 4.1 04/18/2023     04/18/2023    K 3.4 (L) 04/18/2023     04/18/2023    CO2 24.0 04/18/2023     No results found for: \"EAG\", \"A1C\"  Lab Results   Component Value Date    CHOLEST 151 02/12/2023    TRIG 122 02/12/2023    HDL 35 (L) 02/12/2023    LDL 94 02/12/2023    VLDL 20 02/12/2023    NONHDLC 116 02/12/2023     No results found for: \"B12\", \"VITB12\"  Lab Results   Component Value Date    VITD 8.9 (L) 02/12/2023       Current Outpatient Medications on File Prior to Visit   Medication Sig Dispense Refill    semaglutide-weight management (WEGOVY) 2.4 MG/0.75ML Subcutaneous Solution Auto-injector INJECT 0.75 ML (2.4 MG TOTAL) INTO THE SKIN ONCE A WEEK. 9 mL 0    Multiple Vitamin (MULTI-VITAMIN DAILY) Oral Tab Take by mouth.       No current facility-administered medications on file prior to visit.       ASSESSMENT/PLAN    ICD-10-CM    1. Therapeutic drug monitoring  Z51.81       2. Obesity, morbid, BMI 40.0-49.9 (HCC)  E66.01       3. Vitamin D deficiency  E55.9       4. Low HDL (under 40)  E78.6           PLAN   Initial Weight Data and Goal Weight Loss:  Initial consult: #304 lbs on 3/2023  Weight Calculations  Initial Weight: 304 lbs  Initial Weight Date: 03/01/23  Today's Weight: 279 lbs  5% Goal: 15.2  10% Goal: 30.4  Total  Weight Loss: 25 lbs  Total weight loss: Down 8 lbs total, Net loss 25 lbs  Continue with medications: wegovy 2.4mg weekly   Will trial medication: phentermine 15mg   --advised of side effects and adverse effects of this medication  Contradictions: has done phentermine in the past    Reviewed labs (has labs pended from PCP)   Continue with vitamin d (high dose from PCP)   HLD- low HDL, lifestyle education done, follows with PCP   Encouraged physical exercise/ activity and add in strength training  Nutrition: Low carb diet, recommended to eat breakfast daily/ regular protein intake  Follow up with dietitian and psychologist as recommended.  Discussed the role of sleep and stress in weight management.  Counseled on comprehensive weight loss plan including attention to nutrition, exercise and behavior/stress management for success. See patient instruction below for more details.  Discussed strategies to overcome barriers to successful weight loss and weight maintenance  FITTE: ACSM recommendations (150-300 minutes/ week in active weight loss)   Weight Loss Consent to treat reviewed and signed.    Total time spent on chart review, pre-charting, obtaining history, counseling, and educating, reviewing labs was 30 minutes.       NOTE TO PATIENT: The 21st Century Cures Act makes clinical notes like these available to patients in the interest of transparency. Clinical notes are medical documents used by physicians and care providers to communicate with each other. These documents include medical language and terminology, abbreviations, and treatment information that may sound technical and at times possibly unfamiliar. In addition, at times, the verbiage may appear blunt or direct. These documents are one tool providers use to communicate relevant information and clinical opinions of the care providers in a way that allows common understanding of the clinical context.     There are no Patient Instructions on file for this  visit.    No follow-ups on file.    Patient verbalizes understanding.    Nell Sanabria, APRN

## 2024-04-16 NOTE — PATIENT INSTRUCTIONS
Next steps:  1.  Fill your prescribed medication and take as discussed and prescribed: wegovy 2.4mg weekly and phentermine 15mg daily  2.  Schedule a personal nutrition consultation with one of our registered dieticians     Please try to work on the following dietary changes:  Daily protein recommendation to start:  grams  Daily carbohydrate: <165g  Daily calories: 1,900-2,000  1.  Drink water with meals and throughout the day, cut down on soda and/or juice if consumed. Consider flavored water options like Bubbly, Spindrift, Hint and Anuj.  2.  Eat breakfast daily and focus on having protein with each meal, examples include: greek yogurt, cottage cheese, hard boiled egg, whole grain toast with peanut butter.   3.  Reduce refined carbohydrates and sugars which includes items such as sweets, as well as rice, pasta, and bread and make sure to choose whole grain options when having them with just 1 serving per meal about the size of your inner palm.  4.  Consume non starchy veggies daily working towards making them a good 50% of your daily food intake. Add them to lunch and dinner consistently.  5.  Start a daily probiotic: VSL#3 is recommended, (order on line at www.vsl3.com). Take 1 capsule daily with water for 30 days, then reduce to 1 every other day (this will reduce the cost). Capsules can be left out for 2 weeks, but then must be refrigerated.      Please download saw My Fitness Pal, LoseIt! Or Net Diary to monitor daily dietary intake and you will be able to see if you are eating the right amount of calories or too much or too little which would hinder weight loss. Additionally this will help to see your daily carbohydrate and protein intake. When you set the saw up choose 1-2 lbs/week as a goal.  Keeping a paper food journal is an option as well to remain accountable for your choices- this is the start to mindful eating! A low calorie diet has been consistently shown to support weight loss.      Continue or start exercising to help establish a routine. If not already exercising begin with 1 day and progress as able with long-term goal of 30 minutes 5 days a week at a minimum.     Meditation daily can help manage and control stress. Chronic stress can make weight loss difficult.  Exercising is one way to help with stress, but meditation using the CALM Arleth or another comparable alternative can be done in your home or place of work with little time commitment. This Arleth can also help work on behavior change and improve sleep. Check out the segment under Calm Masterclass and listen to The 4 Pillars of Health. A great way to begin learning about the foundation of lifestyle with practical tips to use in your every day.     Check out www.yourweightmatters.org blog for continued daily support and education along this weight loss journey!    Patient Resources:     Personal Training/Fitness Classes/Health Coaching     [facility] @ [email or web address].org/healthy-driven/[email or web address]-center Full fitness center with group fitness and personal training. Discount available as client of [facility].  Health Coaching and Personal Training with Merced Palmer at our [place] [facility]- individual weekly coaching with option to add personal training and small group fitness classes targeted at weight loss- [phone] and/or email @ [email or web address]  360FIT [place] [email or web address] Group Fitness [phone] and/or email Kelly at [email or web address]  [facility] [place] Fitness Centers with multiple locations: [place] [employer] Resources (www.FX Aligned), Eat The Frog Fitness (www.PageFair.TransEnterix), Fit Body Bootcamp (www.VT Enterprisebodybootcamp.TransEnterix), "UQ[employer] Inc." Fitness (www.InhibOx.TransEnterix), The Exercise  (www.exercisecoach.TransEnterix)     Online Fitness  Fitness  on Jive Bike  Fit in 10 DVD series- www.mtdnn36DOF.com  Sit and Be Fit - Chair  exercise series Www.sitandbefit.org  Hip Hop Fit with Denver Hall at www.hiphopfit.net     Apps for on the Go Fitness  [place] 7 Minute Workout (orange box with white 7) - free on the go HIIT training arleth  Peloton Arleth @ www.onepeloton.com     Nutrition Trackers and Tools  LoseIT! And My Fitness Pal apps and on line for tracking nutrition  NOOM - virtual health coaching  FitFoundation (healthy meals on the go) in [place] @ www.uuumtxdnrirmc1sEcorithm  Markie AL @ wwwConnectSoftbistromd.com and Etmrjn16 (keto and low carb plans recommended) @ wwwConnectSoftxhmcse94.com, Metabolic Meals @ www.MyMetabolicMeals.HeartWare International - individual prepared meals to go  Gobble, Blue Apron, Home , Every Plate, Sunbasket- on line meal delivery programs for preparation at home  Meal Village in [place] for homemade meals to go @ www.mealNotesFirstage.HeartWare International  Diet Doctor @ www.dietdoctor.com - low carb swaps  YuZiptask - meal prep and planning arleth (www.yummly.com)     Stress Management/Behavior/Mindful Eating  CALM meditation arleth (www.calm.com)  Headspace  Am I Hungry? Mindful eating virtual  arleth  Www.yourweightmatters.org - Obesity Action Coalition sponsored Blog posts daily  Motivation arleth (black box with white \")- daily supportive messages sent to your phone     Books/Video Education/Podcasts  Mindless Eating by Ady Sotelo  Why We Get Sick by Valerio Ugarte (a book about insulin resistance)  Atomic Habits by Arun Victoria (a book about taking small steps to promote greater behavior change)   Can't Hurt Me by Shakir Zaragoza (a book exploring the power of discipline in achieving your goals)  The End of Dieting: How to Live for Life by Dr. Rjei Amador M.D. or listen to The Adaptive Digital Power Podcast Episode 63: Understanding \"Nutritarian\" Eating w/Dr. Reji Amador  Your Body in Balance: The New Science of Food, Hormones, and Health by Dr. Parminder Tomlinson  The Menopause Diet Plan by Roxie Bonner and Maria T Garcia  The Complete Guide to fasting by Dr. Briseno  Sugar, Salt &  Fat by Madyson Bro, Ph.D, R.D.  Weight Loss Surgery Will Not Treat Food Addiction by Chanel Schafer Ph.D  The Game Changers- MySkillBase Technologiesix Documentary on plant based nutrition  Fed Up - documentary about obesity (Free on Utube)  The Truth About Sugar - documentary on sugar (Free on Utube, https://youtu.be/9E5udjfYE0e)  The Dr. Doherty T5 Wellness Plan by Dr. Elmer Doherty MD  Fitlosophy Fitspiration - journal to better health (found at [employer] in fitness aisle)  What Happened to You?- a look at the impact trauma has on behavior written by David Vicente and Dr. Bradford Cormier  Whole Again by Sagar Bañuelos - discovering your true self after trauma  Brian Salcedo talk on Devex, The Call to Courage  Podcasts: The Exam Room by the Physician's Committee, Nutrition Facts by Dr. Beck    We are here to support you with weight loss, but please remember that you still need your primary care provider for your routine health maintenance.

## 2024-06-12 DIAGNOSIS — Z51.81 THERAPEUTIC DRUG MONITORING: ICD-10-CM

## 2024-06-12 DIAGNOSIS — E66.01 OBESITY, MORBID, BMI 40.0-49.9 (HCC): ICD-10-CM

## 2024-06-13 RX ORDER — SEMAGLUTIDE 2.4 MG/.75ML
0.75 INJECTION, SOLUTION SUBCUTANEOUS WEEKLY
Qty: 3 ML | Refills: 2 | Status: SHIPPED | OUTPATIENT
Start: 2024-06-13

## 2024-06-13 NOTE — TELEPHONE ENCOUNTER
Requesting   Requested Prescriptions     Pending Prescriptions Disp Refills    WEGOVY 2.4 MG/0.75ML Subcutaneous Solution Auto-injector [Pharmacy Med Name: WEGOVY 2.4 MG/0.75 ML PEN]  0     Sig: INJECT 0.75 ML (2.4 MG TOTAL) INTO THE SKIN ONCE A WEEK.       LOV: 04/16/2024  RTC: in about 3 months  Filled: 03/12/2024 #9ml with 0 refills    Future Appointments   Date Time Provider Department Center   6/25/2024  7:45 AM Luba Cardoso MD [facility] 3 [facility] [place]   9/23/2024 11:20 AM Nell Sanabria APRN [facility]

## 2024-07-11 DIAGNOSIS — E66.01 OBESITY, MORBID, BMI 40.0-49.9 (HCC): ICD-10-CM

## 2024-07-11 DIAGNOSIS — Z51.81 THERAPEUTIC DRUG MONITORING: ICD-10-CM

## 2024-07-11 RX ORDER — PHENTERMINE HYDROCHLORIDE 15 MG/1
15 CAPSULE ORAL EVERY MORNING
Qty: 30 CAPSULE | Refills: 2 | Status: SHIPPED | OUTPATIENT
Start: 2024-07-11

## 2024-07-11 RX ORDER — SEMAGLUTIDE 2.4 MG/.75ML
0.75 INJECTION, SOLUTION SUBCUTANEOUS WEEKLY
Qty: 3 ML | Refills: 2 | OUTPATIENT
Start: 2024-07-11

## 2024-07-11 RX ORDER — SEMAGLUTIDE 2.4 MG/.75ML
0.75 INJECTION, SOLUTION SUBCUTANEOUS WEEKLY
Qty: 3 ML | Refills: 2 | Status: SHIPPED | OUTPATIENT
Start: 2024-07-11

## 2024-07-11 RX ORDER — PHENTERMINE HYDROCHLORIDE 15 MG/1
15 CAPSULE ORAL EVERY MORNING
Qty: 90 CAPSULE | Refills: 0 | Status: SHIPPED | OUTPATIENT
Start: 2024-07-11

## 2024-07-11 NOTE — TELEPHONE ENCOUNTER
Requesting   Requested Prescriptions     Pending Prescriptions Disp Refills    Phentermine HCl 15 MG Oral Cap 30 capsule 2     Sig: Take 1 capsule (15 mg total) by mouth every morning.    semaglutide-weight management (WEGOVY) 2.4 MG/0.75ML Subcutaneous Solution Auto-injector 3 mL 2     Sig: Inject 0.75 mL (2.4 mg total) into the skin once a week.         LOV: 04/16/2024  RTC: in about 3months  Filled: 04/16/2024 #30 with 2 refills    LOV: 04/16/2024  RTC: in about 3months  Filled: 06/13/2024 #3 ml with 2 refills    Future Appointments   Date Time Provider Department Center   9/23/2024 11:20 AM Nell Sanabria APRN [facility] [address]   9/30/2024 12:00 PM Luba Cardoso MD [facility] 3 [facility] [place] is there a way to have it as a 3 month supply given at once as it was done before this refill. [place] and if I refill a month supply I won’t have enough [place] seas. As well as just a regular refill on the phentermine

## 2024-08-29 ENCOUNTER — HOSPITAL ENCOUNTER (EMERGENCY)
Facility: HOSPITAL | Age: 34
Discharge: LEFT BEFORE DISCHARGE | End: 2024-08-29
Payer: COMMERCIAL

## 2024-08-29 VITALS
SYSTOLIC BLOOD PRESSURE: 126 MMHG | WEIGHT: 280 LBS | DIASTOLIC BLOOD PRESSURE: 80 MMHG | RESPIRATION RATE: 20 BRPM | OXYGEN SATURATION: 99 % | TEMPERATURE: 99 F | HEIGHT: 69 IN | BODY MASS INDEX: 41.47 KG/M2 | HEART RATE: 81 BPM

## 2024-08-29 NOTE — ED INITIAL ASSESSMENT (HPI)
Reports back pain since last Tuesday after departing airplane from [place]. Reports SOB from pain, denies chest pain. Reports taking tylenol, lidocaine, that have brought no relief.

## 2024-08-30 ENCOUNTER — HOSPITAL ENCOUNTER (EMERGENCY)
Facility: HOSPITAL | Age: 34
Discharge: HOME | End: 2024-08-30
Attending: EMERGENCY MEDICINE
Payer: COMMERCIAL

## 2024-08-30 ENCOUNTER — APPOINTMENT (OUTPATIENT)
Dept: GENERAL RADIOLOGY | Facility: HOSPITAL | Age: 34
End: 2024-08-30
Attending: EMERGENCY MEDICINE
Payer: COMMERCIAL

## 2024-08-30 VITALS
SYSTOLIC BLOOD PRESSURE: 132 MMHG | HEIGHT: 69 IN | HEART RATE: 85 BPM | BODY MASS INDEX: 41.47 KG/M2 | TEMPERATURE: 98 F | RESPIRATION RATE: 16 BRPM | DIASTOLIC BLOOD PRESSURE: 72 MMHG | WEIGHT: 280 LBS | OXYGEN SATURATION: 100 %

## 2024-08-30 DIAGNOSIS — M62.838 SPASM OF MUSCLE: Primary | ICD-10-CM

## 2024-08-30 PROCEDURE — 99284 EMERGENCY DEPT VISIT MOD MDM: CPT

## 2024-08-30 PROCEDURE — 72100 X-RAY EXAM L-S SPINE 2/3 VWS: CPT | Performed by: EMERGENCY MEDICINE

## 2024-08-30 PROCEDURE — 96372 THER/PROPH/DIAG INJ SC/IM: CPT

## 2024-08-30 PROCEDURE — 99283 EMERGENCY DEPT VISIT LOW MDM: CPT

## 2024-08-30 RX ORDER — PREDNISONE 20 MG/1
40 TABLET ORAL ONCE
Status: COMPLETED | OUTPATIENT
Start: 2024-08-30 | End: 2024-08-30

## 2024-08-30 RX ORDER — KETOROLAC TROMETHAMINE 30 MG/ML
30 INJECTION, SOLUTION INTRAMUSCULAR; INTRAVENOUS ONCE
Status: COMPLETED | OUTPATIENT
Start: 2024-08-30 | End: 2024-08-30

## 2024-08-30 RX ORDER — METHYLPREDNISOLONE 4 MG
TABLET, DOSE PACK ORAL
Qty: 1 EACH | Refills: 0 | Status: SHIPPED | OUTPATIENT
Start: 2024-08-30 | End: 2024-09-05 | Stop reason: ALTCHOICE

## 2024-08-30 RX ORDER — ACETAMINOPHEN 500 MG
1000 TABLET ORAL ONCE
Status: COMPLETED | OUTPATIENT
Start: 2024-08-30 | End: 2024-08-30

## 2024-08-30 RX ORDER — TIZANIDINE HYDROCHLORIDE 2 MG/1
2 CAPSULE, GELATIN COATED ORAL 3 TIMES DAILY
Qty: 30 CAPSULE | Refills: 0 | Status: SHIPPED | OUTPATIENT
Start: 2024-08-30

## 2024-08-30 NOTE — ED PROVIDER NOTES
Patient Seen in: [facility] Emergency Department      History     Chief Complaint   Patient presents with    Back Pain     Stated Complaint: Lower back pain raditing down leg    Subjective:   HPI    Posterior right hip pain.  Has been coming and going for years, usually bothers her once a year.  And reached peak intensity lately as she is getting off a flight from [place].  Very specific spot posterior right hip.  Worsen movement and certain positions.  Pain occasional does radiate down.        Objective:   Past Medical History:    Anemia    Hypothyroidism    Incompetent cervix in pregnancy, antepartum, second trimester (HCC)    Ovarian hyperstimulation syndrome    PCOS (polycystic ovarian syndrome)              Past Surgical History:   Procedure Laterality Date    Follicle punc,retrieval of oocyte  2018    Other surgical history  06/14    ectopic pregnancy                Social History     Socioeconomic History    Marital status:    Tobacco Use    Smoking status: Never    Smokeless tobacco: Never   Vaping Use    Vaping status: Never Used   Substance and Sexual Activity    Alcohol use: No    Drug use: No    Sexual activity: Yes     Partners: Male     Birth control/protection: Depo Provera   Other Topics Concern    Caffeine Concern No     Comment: 2cups of coffee daily    Exercise Yes     Comment: walk 3 x a weel    Seat Belt Yes    Self-Exams Yes              Review of Systems    Positive for stated Chief Complaint: Back Pain    Other systems are as noted in HPI.  Constitutional and vital signs reviewed.      All other systems reviewed and negative except as noted above.    Physical Exam     ED Triage Vitals [08/30/24 0954]   /72   Pulse 85   Resp 16   Temp 98.2 °F (36.8 °C)   Temp src Temporal   SpO2 100 %   O2 Device None (Room air)       Current Vitals:   Vital Signs  BP: 132/72  Pulse: 85  Resp: 16  Temp: 98.2 °F (36.8 °C)  Temp src: Temporal    Oxygen Therapy  SpO2: 100 %  O2 Device: None  (Room air)            Physical Exam    Physical Exam   Constitutional: Awake, alert, well appearing  Head: Normocephalic and atraumatic.   Eyes: Conjunctivae are normal. Pupils are equal, round, and reactive to light.   Neck: Normal range of motion. No JVD  Cardiovascular: Normal rate, regular rhythm  Pulmonary/Chest: Normal effort.  No accessory muscle use.  No cyanosis.  Abdominal: Soft. Not distended.  Neurological: Pt is alert and oriented to person, place, and time. no cranial nerve deficits. Speech fluent      No cervical lumbar or thoracic spinal tenderness.  She has a very specific point of muscular tenderness really  just above the gluteus muscles.  I am able to range the hip fully without any issues.  Calf is not swollen or tender, no skin changes, I warm well-perfused with palpable pulse.    ED Course   Labs Reviewed - No data to display          XR LUMBAR SPINE (MIN 2 VIEWS) (CPT=72100)    Result Date: 8/30/2024  CONCLUSION:  No acute findings.   LOCATION:  [facility]   Dictated by (CST): Enrike Main MD on 8/30/2024 at 11:43 AM     Finalized by (CST): Enrike Main MD on 8/30/2024 at 11:44 AM          Medications   ketorolac (Toradol) 30 MG/ML injection 30 mg (30 mg Intramuscular Given 8/30/24 1201)   predniSONE (Deltasone) tab 40 mg (40 mg Oral Given 8/30/24 1201)   acetaminophen (Tylenol Extra Strength) tab 1,000 mg (1,000 mg Oral Given 8/30/24 1201)              MDM          Differential diagnoses considered: Favor musculoskeletal back pain but the possibly lumbar radiculopathy, SI joint pathology, compression fracture all considered.    -No calf pain suggest DVT clinically not suggested on exam either      *Prescription sent as noted below for Medrol Dosepak and tizanidine would likely benefit from physical therapy.      I visualized the radiology studies, my independent interpretation: No obvious displaced fracture noted    *Discussion of ongoing management of this patient's care included:  n/a  *Comorbidities contributing to the complexity of decision making: n/a  *External charts reviewed: n/a  *Additional sources of history: n/a    Shared decision making was done by: patient, myself.                                     Medical Decision Making      Disposition and Plan     Clinical Impression:  1. Spasm of muscle         Disposition:  Discharge  8/30/2024 11:35 am    Follow-up:  Luba Cardoso MD  [address]  [facility] [address] [phone]    Follow up            Medications Prescribed:  Current Discharge Medication List        START taking these medications    Details   tiZANidine HCl 2 MG Oral Cap Take 1 capsule (2 mg total) by mouth 3 (three) times daily.  Qty: 30 capsule, Refills: 0      methylPREDNISolone 4 MG Oral Tablet Therapy Pack Dosepack: use as directed on packaging  Qty: 1 each, Refills: 0

## 2024-08-30 NOTE — ED INITIAL ASSESSMENT (HPI)
Pt in from home complains of right hip pain that radiates down her right leg and up her back. Pt reports she came back from [place] on Tuesday and that's when the pain started. Pt states she has hx of right hip pain and had an xray a year ago which was normal. Pt denies any other trauma to the hip/back.

## 2024-09-05 ENCOUNTER — PATIENT MESSAGE (OUTPATIENT)
Dept: FAMILY MEDICINE CLINIC | Facility: CLINIC | Age: 34
End: 2024-09-05

## 2024-09-05 ENCOUNTER — OFFICE VISIT (OUTPATIENT)
Dept: FAMILY MEDICINE CLINIC | Facility: CLINIC | Age: 34
End: 2024-09-05
Payer: COMMERCIAL

## 2024-09-05 VITALS
HEIGHT: 68.5 IN | HEART RATE: 88 BPM | WEIGHT: 286.81 LBS | RESPIRATION RATE: 16 BRPM | SYSTOLIC BLOOD PRESSURE: 112 MMHG | DIASTOLIC BLOOD PRESSURE: 70 MMHG | TEMPERATURE: 98 F | BODY MASS INDEX: 42.97 KG/M2

## 2024-09-05 DIAGNOSIS — M54.50 LUMBAR BACK PAIN: ICD-10-CM

## 2024-09-05 DIAGNOSIS — M25.551 RIGHT HIP PAIN: Primary | ICD-10-CM

## 2024-09-05 PROCEDURE — 99214 OFFICE O/P EST MOD 30 MIN: CPT | Performed by: STUDENT IN AN ORGANIZED HEALTH CARE EDUCATION/TRAINING PROGRAM

## 2024-09-05 PROCEDURE — 3078F DIAST BP <80 MM HG: CPT | Performed by: STUDENT IN AN ORGANIZED HEALTH CARE EDUCATION/TRAINING PROGRAM

## 2024-09-05 PROCEDURE — 3074F SYST BP LT 130 MM HG: CPT | Performed by: STUDENT IN AN ORGANIZED HEALTH CARE EDUCATION/TRAINING PROGRAM

## 2024-09-05 PROCEDURE — 3008F BODY MASS INDEX DOCD: CPT | Performed by: STUDENT IN AN ORGANIZED HEALTH CARE EDUCATION/TRAINING PROGRAM

## 2024-09-05 RX ORDER — MELOXICAM 15 MG/1
15 TABLET ORAL DAILY
Qty: 14 TABLET | Refills: 0 | Status: SHIPPED | OUTPATIENT
Start: 2024-09-05

## 2024-09-05 NOTE — TELEPHONE ENCOUNTER
From: Jil High  To: Luba Cardoso  Sent: 9/5/2024 3:50 AM CDT  Subject: After ER visit    Hi doctor walker,  I had a visit at your office last week for back pain and it was so painful I made it to the ER instead. They did an xray, and gave me some steroids and muscle relaxers and said I would be okay to work on Tuesday. I went to work yesterday and my back/hip is in so much pain that I can’t walk more than a few feet today. (While on muscle relaxers, pain medicine and the last day of steroids l). I have a new appointment with your office for the 18th but I can’t really function right now and needed some medical advice on how to proceed until then. Thank you for your time

## 2024-09-05 NOTE — PROGRESS NOTES
[facility]    Chief Complaint   Patient presents with    ER F/U     Pain in R hip flying home from [place]. Needed a w/c from the gate. Seen at ER 6 days ago.        HPI:   Jil High is 33 year old patient presenting for the followin. Patientw as seen in emergency room and for msk spasm on  and prescribed tizanidine and a medrol dosepack. Today, notes right posterior hip pain.  Taking tizanidine TID, is having some sleepiness from this.  She is taking advil 400-600 mg every 6 hours.     She feels posterior hip pain.  Pain moves down her leg and up her back. Sometimes she cannot sit for very long.       Study Result    Narrative   PROCEDURE:  XR LUMBAR SPINE (MIN 2 VIEWS) (CPT=72100)     TECHNIQUE:  AP, lateral, and coned down L5-S1 views were obtained.     COMPARISON:  None.     INDICATIONS:  Lower back pain raditing down leg     PATIENT STATED HISTORY: (As transcribed by Technologist)  Patient states having a lower lumbar pain that radiates down the right leg.         FINDINGS:      Lumbar vertebral alignment is within normal limits..  No acute fractures or osseous lesions are identified.  Mild degenerative changes with facet hypertrophy.                   Impression   CONCLUSION:  No acute findings.        LOCATION:  [facility]        Dictated by (CST): Enrike Main MD on 2024 at 11:43 AM      Finalized by (CST): Enrike Main MD on 2024 at 11:44 AM          PMH:  Patient Active Problem List   Diagnosis    Iron deficiency anemia    History of  section    Obesity without serious comorbidity    Vitamin D deficiency    Therapeutic drug monitoring    Obesity, morbid, BMI 40.0-49.9 ([facility])     Past Medical History:    Anemia    Hypothyroidism    Incompetent cervix in pregnancy, antepartum, second trimester ([facility])    Ovarian hyperstimulation syndrome    PCOS (polycystic ovarian syndrome)          SH: reviewed     FH: reviewed        ROS: full 10 point review of  systems done and otherwise negative.      Healthcare Maintenance:       PE:  Vital Signs    24 1124   BP: 112/70   Pulse: 88   Resp: 16   Temp: 98.4 °F (36.9 °C)     Wt Readings from Last 3 Encounters:   24 286 lb 12.8 oz (130.1 kg)   24 280 lb (127 kg)   24 279 lb (126.6 kg)     Body mass index is 42.97 kg/m².     Physical Exam:  GEN: Well-appearing, NAD, nontoxic  CARD: Regular rate  PULM: Comfortable WOB  ABD: soft, nt, nd  EXT: No gross deformity  NEURO: no gross deficit  PSYCH: normal affect, thought process linear  Back Exam:   Inspection: Unremarkable   ROM normal  XSLR laying: Negative   Palpable tenderness: low back pain on the right.  ARCHANA: negative.  Gross sensation intact throughout  Gait unremarkable     Hip - rom normal. Palpable tenderness posterior right hip latearl to the sacrum         No visits with results within 4 Week(s) from this visit.   Latest known visit with results is:   Office Visit on 10/17/2023   Component Date Value Ref Range Status    Pregnancy Test, Urine 10/17/2023 negative   Final    Control Line Present with a clear * 10/17/2023 yes  Yes/No Final    Kit Lot # 10/17/2023 [id number]  Numeric Final    Kit Expiration Date 10/17/2023 1/5/25  Date Final    HPV High Risk 10/17/2023 Negative  Negative Final    HPV Source 10/17/2023 Endocervix   Final    Diagnosis:  10/17/2023 NEGATIVE FOR INTRAEPITHELIAL LESION OR MALIGNANCY.  THIS SPECIMEN WAS RESCREENED AS PART OF OUR  PROGRAM.   Final    Specimen Adequacy: 10/17/2023 Comment   Final    Performed By: 10/17/2023 Comment   Final    QC Reviewed By: 10/17/2023 Comment   Final    . 10/17/2023 .   Final    Note: 10/17/2023 Comment   Final    Test Methodology: 10/17/2023 Comment   Final    Clinician provided ICD10: 10/17/2023 Comment   Final        A/P: Jil High is 33 year old presenting for the followin. Lumbar back pain.  C/w muscle spasm vs strain. Meloxicam once daily for 7 days and then switch  to \"as needed\" once daily  Tylenol 1000 mg three times per day  Tizanidine at night PRN (muscle relaxer)    PT order placed        Outpatient Encounter Medications as of 9/5/2024   Medication Sig Dispense Refill    Meloxicam 15 MG Oral Tab Take 1 tablet (15 mg total) by mouth daily. After one week, switch to as needed. Do not take ibuprofen with this. 14 tablet 0    tiZANidine HCl 2 MG Oral Cap Take 1 capsule (2 mg total) by mouth 3 (three) times daily. 30 capsule 0    semaglutide-weight management (WEGOVY) 2.4 MG/0.75ML Subcutaneous Solution Auto-injector Inject 0.75 mL (2.4 mg total) into the skin once a week. 3 mL 2    Phentermine HCl 15 MG Oral Cap Take 1 capsule (15 mg total) by mouth every morning. (Patient not taking: Reported on 8/29/2024) 90 capsule 0    Multiple Vitamin (MULTI-VITAMIN DAILY) Oral Tab Take by mouth.      [DISCONTINUED] methylPREDNISolone 4 MG Oral Tablet Therapy Pack Dosepack: use as directed on packaging 1 each 0    Phentermine HCl 15 MG Oral Cap Take 1 capsule (15 mg total) by mouth every morning. (Patient not taking: Reported on 8/29/2024) 30 capsule 2     No facility-administered encounter medications on file as of 9/5/2024.           Side effects, risks and benefits of medications were explained.  The patient or responsible adult showed the ability to learn, asked appropriate questions.  There were no barriers to learning and they verbalized understanding of the treatment plan.     Medication list provided to patient and /or family member.        Dalia Goss MD

## 2024-09-05 NOTE — PATIENT INSTRUCTIONS
Meloxicam once daily for 7 days and then switch to \"as needed\" once daily  Tylenol 1000 mg three times per day  Tizanidine at night PRN (muscle relaxer)    PT order placed

## 2024-09-06 NOTE — TELEPHONE ENCOUNTER
Called and talked to patient she scheduled at [facility] needs order faxed to them at [phone] so we faxed the referral as requested.

## 2024-09-23 ENCOUNTER — OFFICE VISIT (OUTPATIENT)
Dept: INTERNAL MEDICINE CLINIC | Facility: CLINIC | Age: 34
End: 2024-09-23
Payer: COMMERCIAL

## 2024-09-23 VITALS
BODY MASS INDEX: 42.51 KG/M2 | DIASTOLIC BLOOD PRESSURE: 78 MMHG | SYSTOLIC BLOOD PRESSURE: 124 MMHG | RESPIRATION RATE: 16 BRPM | HEIGHT: 69 IN | HEART RATE: 100 BPM | WEIGHT: 287 LBS

## 2024-09-23 DIAGNOSIS — Z51.81 THERAPEUTIC DRUG MONITORING: Primary | ICD-10-CM

## 2024-09-23 DIAGNOSIS — E66.01 OBESITY, MORBID, BMI 40.0-49.9 (HCC): ICD-10-CM

## 2024-09-23 DIAGNOSIS — E78.6 LOW HDL (UNDER 40): ICD-10-CM

## 2024-09-23 DIAGNOSIS — E55.9 VITAMIN D DEFICIENCY: ICD-10-CM

## 2024-09-23 PROCEDURE — 3074F SYST BP LT 130 MM HG: CPT | Performed by: NURSE PRACTITIONER

## 2024-09-23 PROCEDURE — 3078F DIAST BP <80 MM HG: CPT | Performed by: NURSE PRACTITIONER

## 2024-09-23 PROCEDURE — 99214 OFFICE O/P EST MOD 30 MIN: CPT | Performed by: NURSE PRACTITIONER

## 2024-09-23 PROCEDURE — 3008F BODY MASS INDEX DOCD: CPT | Performed by: NURSE PRACTITIONER

## 2024-09-23 RX ORDER — PHENTERMINE HYDROCHLORIDE 15 MG/1
15 CAPSULE ORAL EVERY MORNING
Qty: 90 CAPSULE | Refills: 0 | Status: SHIPPED | OUTPATIENT
Start: 2024-09-23

## 2024-09-23 RX ORDER — SEMAGLUTIDE 2.4 MG/.75ML
0.75 INJECTION, SOLUTION SUBCUTANEOUS WEEKLY
Qty: 3 ML | Refills: 2 | Status: SHIPPED | OUTPATIENT
Start: 2024-09-23

## 2024-09-23 NOTE — PROGRESS NOTES
HISTORY OF PRESENT ILLNESS  Chief Complaint   Patient presents with    Weight Check     +8     Jil High is a 33 year old female here for follow up with medical weight loss program for the treatment of overweight, obesity, or morbid obesity.     Up #8 lbs (f/u from 4/2024)   Compliant on wegovy 2.4mg weekly and phentermine 15mg (had stopped taking a couple of months ago due to headaches)   Tolerating well, helping with decreasing appetite and no side effects     Injured back- 1.5 months ago (slipped disc)   Was in a bad mental space...   Is currently doing PT 3 times per week  Working from home  Is just starting to feel better...   Exercise/Activity: 3x/ week, via PT- limited due to back pain  Nutrition: eating regular meals, +protein, minimal veggies. not tracking reports  Meals out per week on average: 0-1  Stress is manageable   Sleep: 6 hours/night, waking up feeling rested    Denies chest pain, shortness of breath, dizziness, blurred vision, headache, paresthesia, nausea/vomiting.     Breakfast Lunch Dinner Snacks Fluids   Reviewed              Wt Readings from Last 6 Encounters:   09/23/24 287 lb (130.2 kg)   09/05/24 286 lb 12.8 oz (130.1 kg)   08/30/24 280 lb (127 kg)   04/16/24 279 lb (126.6 kg)   02/02/24 288 lb 9.6 oz (130.9 kg)   01/09/24 285 lb (129.3 kg)          REVIEW OF SYSTEMS  GENERAL: feels well otherwise, denied any fevers chills or night sweats   LUNGS: denies shortness of breath  CARDIOVASCULAR: denies chest pain  GI: denies abdominal pain  MUSCULOSKELETAL: +back pain (right hip), joint pains   PSYCH: denies change in behavior or mood, denies feeling sad or depressed    EXAM  /78   Pulse 100   Resp 16   Ht 5' 9\" (1.753 m)   Wt 287 lb (130.2 kg)   LMP 08/20/2024 (Exact Date)   BMI 42.38 kg/m²       GENERAL: well developed, well nourished, in no apparent distress, A/O x3  SKIN: no rashes, no suspicious lesions  HEENT: atraumatic, normocephalic, OP-clear, PERRLA  NECK: supple, no  adenopathy  LUNGS: CTA in all fields, breathing non labored  CARDIO: RRR without murmur  GI: +BS, NT/ND, no masses or HSM  EXTREMITIES: no cyanosis, no clubbing, no edema    Lab Results   Component Value Date     (H) 04/18/2023    BUN 11 04/18/2023    BUNCREA 12.4 12/27/2019    CREATSERUM 0.99 04/18/2023    ANIONGAP 4 04/18/2023    GFRNAA 89 09/22/2021    GFRAA 103 09/22/2021    CA 8.8 04/18/2023    OSMOCALC 286 04/18/2023    ALKPHO 73 04/18/2023    AST 23 04/18/2023    ALT 34 04/18/2023    BILT 0.2 04/18/2023    TP 7.5 04/18/2023    ALB 3.4 04/18/2023    GLOBULIN 4.1 04/18/2023     04/18/2023    K 3.4 (L) 04/18/2023     04/18/2023    CO2 24.0 04/18/2023     No results found for: \"EAG\", \"A1C\"  Lab Results   Component Value Date    CHOLEST 151 02/12/2023    TRIG 122 02/12/2023    HDL 35 (L) 02/12/2023    LDL 94 02/12/2023    VLDL 20 02/12/2023    NONHDLC 116 02/12/2023     No results found for: \"B12\", \"VITB12\"  Lab Results   Component Value Date    VITD 8.9 (L) 02/12/2023       Current Outpatient Medications on File Prior to Visit   Medication Sig Dispense Refill    Meloxicam 15 MG Oral Tab Take 1 tablet (15 mg total) by mouth daily. After one week, switch to as needed. Do not take ibuprofen with this. 14 tablet 0    tiZANidine HCl 2 MG Oral Cap Take 1 capsule (2 mg total) by mouth 3 (three) times daily. 30 capsule 0    semaglutide-weight management (WEGOVY) 2.4 MG/0.75ML Subcutaneous Solution Auto-injector Inject 0.75 mL (2.4 mg total) into the skin once a week. 3 mL 2    Phentermine HCl 15 MG Oral Cap Take 1 capsule (15 mg total) by mouth every morning. 90 capsule 0    Multiple Vitamin (MULTI-VITAMIN DAILY) Oral Tab Take by mouth.       No current facility-administered medications on file prior to visit.       ASSESSMENT/PLAN    ICD-10-CM    1. Therapeutic drug monitoring  Z51.81       2. Obesity, morbid, BMI 40.0-49.9 (HCC)  E66.01       3. Vitamin D deficiency  E55.9       4. Low HDL (under 40)   E78.6           PLAN   Initial Weight Data and Goal Weight Loss:  Initial consult: #304 lbs on 3/2023  Weight Calculations  Initial Weight: 304 lbs  Initial Weight Date: 03/01/23  Today's Weight: 287 lbs  5% Goal: 15.2  10% Goal: 30.4  Total Weight Loss: 17 lbs  Total weight loss: up #8 lbs total, Net loss 17 lbs  Continue with medications: wegovy 2.4mg weekly   Will restart medication: phentermine 15mg   --advised of side effects and adverse effects of this medication  Contradictions: has done phentermine in the past    Reviewed labs (still has labs pended from PCP)   Continue with vitamin d (high dose from PCP)   HLD- low HDL, lifestyle education done, follows with PCP   physical exercise/ activity is low (see HPI)   Wrote out macros and encouraged tracking  Nutrition: Low carb diet, recommended to eat breakfast daily/ regular protein intake  Follow up with dietitian and psychologist as recommended.  Discussed the role of sleep and stress in weight management.  Counseled on comprehensive weight loss plan including attention to nutrition, exercise and behavior/stress management for success. See patient instruction below for more details.  Discussed strategies to overcome barriers to successful weight loss and weight maintenance  FITTE: ACSM recommendations (150-300 minutes/ week in active weight loss)   Weight Loss Consent to treat reviewed and signed.    Total time spent on chart review, pre-charting, obtaining history, counseling, and educating, reviewing labs was 30 minutes.       NOTE TO PATIENT: The 21st Century Cures Act makes clinical notes like these available to patients in the interest of transparency. Clinical notes are medical documents used by physicians and care providers to communicate with each other. These documents include medical language and terminology, abbreviations, and treatment information that may sound technical and at times possibly unfamiliar. In addition, at times, the verbiage may  appear blunt or direct. These documents are one tool providers use to communicate relevant information and clinical opinions of the care providers in a way that allows common understanding of the clinical context.     There are no Patient Instructions on file for this visit.    No follow-ups on file.    Patient verbalizes understanding.    Nell Sanabria, APRN

## 2024-09-23 NOTE — PATIENT INSTRUCTIONS
Next steps:  1.  Fill your prescribed medication and take as discussed and prescribed: wegovy 2.4mg weekly and phentermine 15mg   2.  Schedule a personal nutrition consultation with one of our registered dieticians     Please try to work on the following dietary changes:  Daily protein recommendation to start:  grams  Daily carbohydrate: <165g  Daily calories: 1,900-2,000  1.  Drink water with meals and throughout the day, cut down on soda and/or juice if consumed. Consider flavored water options like Bubbly, Spindrift, Hint and Anuj.  2.  Eat breakfast daily and focus on having protein with each meal, examples include: greek yogurt, cottage cheese, hard boiled egg, whole grain toast with peanut butter.   3.  Reduce refined carbohydrates and sugars which includes items such as sweets, as well as rice, pasta, and bread and make sure to choose whole grain options when having them with just 1 serving per meal about the size of your inner palm.  4.  Consume non starchy veggies daily working towards making them a good 50% of your daily food intake. Add them to lunch and dinner consistently.  5.  Start a daily probiotic: VSL#3 is recommended, (order on line at www.vsl3.com). Take 1 capsule daily with water for 30 days, then reduce to 1 every other day (this will reduce the cost). Capsules can be left out for 2 weeks, but then must be refrigerated.      Please download saw My Fitness Pal, LoseIt! Or Net Diary to monitor daily dietary intake and you will be able to see if you are eating the right amount of calories or too much or too little which would hinder weight loss. Additionally this will help to see your daily carbohydrate and protein intake. When you set the saw up choose 1-2 lbs/week as a goal.  Keeping a paper food journal is an option as well to remain accountable for your choices- this is the start to mindful eating! A low calorie diet has been consistently shown to support weight loss.     Continue or  start exercising to help establish a routine. If not already exercising begin with 1 day and progress as able with long-term goal of 30 minutes 5 days a week at a minimum.     Meditation daily can help manage and control stress. Chronic stress can make weight loss difficult.  Exercising is one way to help with stress, but meditation using the CALM Arleth or another comparable alternative can be done in your home or place of work with little time commitment. This Arleth can also help work on behavior change and improve sleep. Check out the segment under Calm Masterclass and listen to The 4 Pillars of Health. A great way to begin learning about the foundation of lifestyle with practical tips to use in your every day.     Check out www.yourweightmatters.org blog for continued daily support and education along this weight loss journey!    Patient Resources:     Personal Training/Fitness Classes/Health Coaching     [facility] @ https://www.eehealth.org/healthy-driven/fitness-center Full fitness center with group fitness and personal training. Discount available as client of [facility].  Health Coaching and Personal Training with Merced Palmer at our [place] [facility]- individual weekly coaching with option to add personal training and small group fitness classes targeted at weight loss- [phone] and/or email @ [email or web address]  360FIT [place] [email or web address]. Group Fitness [phone] and/or email Kelly at [email or web address]  Franchospitalsed Fitness Centers with multiple locations: eVenues (www.Provenance Biopharmaceuticals), Eat The Frog Fitness (www.Genevolve Vision Diagnostics.Portafare), Fit Body Bootcamp (www.Gnzobodyboot4Techp.Portafare), PayPlug Fitness (www.Enovex.Portafare), The Exercise  (www.exercisecoach.Portafare)     Online Fitness  Fitness  on REDPoint International  Fit in 10 DVD series- www.xiwkv76PZB.com  Sit and Be Fit - Chair exercise series  Www.sitandbefit.org  Hip Hop Fit with Denver Hall at www.hiphopfit.net     Apps for on the Go Fitness  Amuso 7 Minute Workout (orange box with white 7) - free on the go HIIT training arleth  Peloton Arleth @ www.onepeloton.com     Nutrition Trackers and Tools  LoseIT! And My Fitness Pal apps and on line for tracking nutrition  NOOM - virtual health coaching  FitFoundation (healthy meals on the go) in [place] @ www.edzmnhonuqxvw1joneDrum  Markie [place] @ wwwFrenzoobistromd.com and Ywkfzv18 (keto and low carb plans recommended) @ www.puxyhl65.com, Metabolic Meals @ www.MyMetabolicMeals.com - individual prepared meals to go  Gobble, Blue Apron, Home , Every Plate, Sunbasket- on line meal delivery programs for preparation at home  Meal Village in [place] for homemade meals to go @ www.mealIvisysage.[employer]  Diet Doctor @ www.dietdoctor.[employer] - low carb swaps  Yummly - meal prep and planning arleth (www.yummly.com)     Stress Management/Behavior/Mindful Eating  CALM meditation arleth (www.calm.com)  Headspace  Am I Hungry? Mindful eating virtual  arleth  Www.yourweightmatters.org - Obesity Action Coalition sponsored Blog posts daily  Motivation arleth (black box with white \")- daily supportive messages sent to your phone     Books/Video Education/Podcasts  Mindless Eating by Ady Sotelo  Why We Get Sick by Valerio Ugarte (a book about insulin resistance)  Atomic Habits by Arun Victoria (a book about taking small steps to promote greater behavior change)   Can't Hurt Me by Shakir Zaragoza (a book exploring the power of discipline in achieving your goals)  The End of Dieting: How to Live for Life by Dr. Reji Amador M.D. or listen to The SafeNet Podcast Episode 63: Understanding \"Nutritarian\" Eating w/Dr. Reji Amador  Your Body in Balance: The New Science of Food, Hormones, and Health by Dr. Parminder Tomlinson  The Menopause Diet Plan by Roxie Bonner and Maria T Garcia  The Complete Guide to fasting by Dr. Briseno  Sugar, Salt & Fat by Madyson  Dieudonne, Ph.D, R.D.  Weight Loss Surgery Will Not Treat Food Addiction by Chanel Schafer Ph.D  The Game Changers- Mobakidsix Documentary on plant based nutrition  Fed Up - documentary about obesity (Free on Utube)  The Truth About Sugar - documentary on sugar (Free on Utube, https://youtu.be/5C6sktwGR0o)  The Dr. Doherty T5 Wellness Plan by Dr. Elmer Doherty MD  Fitlosophy Fitspiration - journal to better health (found at [employer] in fitness aisle)  What Happened to You?- a look at the impact trauma has on behavior written by David Vicente and Dr. Bradford Cormier  Whole Again by Sagar Bañuelos - discovering your true self after trauma  Brian Salcedo talk on LeadCloud, The Call to Courage  Podcasts: The Exam Room by the Physician's Committee, Nutrition Facts by Dr. Beck    We are here to support you with weight loss, but please remember that you still need your primary care provider for your routine health maintenance.

## 2024-09-30 ENCOUNTER — OFFICE VISIT (OUTPATIENT)
Dept: FAMILY MEDICINE CLINIC | Facility: CLINIC | Age: 34
End: 2024-09-30
Payer: COMMERCIAL

## 2024-09-30 VITALS
TEMPERATURE: 98 F | WEIGHT: 284 LBS | HEIGHT: 69 IN | SYSTOLIC BLOOD PRESSURE: 118 MMHG | RESPIRATION RATE: 16 BRPM | BODY MASS INDEX: 42.06 KG/M2 | HEART RATE: 88 BPM | DIASTOLIC BLOOD PRESSURE: 82 MMHG

## 2024-09-30 DIAGNOSIS — Z23 NEED FOR INFLUENZA VACCINATION: Primary | ICD-10-CM

## 2024-09-30 DIAGNOSIS — Z00.00 ROUTINE GENERAL MEDICAL EXAMINATION AT A HEALTH CARE FACILITY: ICD-10-CM

## 2024-09-30 NOTE — PROGRESS NOTES
HPI:   Jil High is a 34 year old female who presents for a complete physical exam.  Last pap:  10/2023 with GYN  Last mammogram:  /   Menses:  irregular  Contraception:  none  Previous colonoscopy:  n/a  Previous DEXA:  /.  Current or previous treatment:  /  Family hx of cancer:  Mat aunt breast  Immunizations:  Tdap:  /, Flu:  /  Occ: yes. : yes. Children: twin daughters.     In PT for back pain.  Seeing chiro.     Anxiety/depression: Took fluoxetine 20mg for a couple of years and did well.  Has been off x 1 year.  Elevated PHQ score today, but pt attributes this to her back injury and unable to exercise which has caused her to regain weight. Discussed restarting medication; pt declines at this time.     Vitamin D def:      Hypothyroidism:  Only treated during pregnancy.  Stopped levothyroxine 50 MCG in Feb 2019.    Seeing [facility]:  phentermine , Wegovy    Wt Readings from Last 6 Encounters:   09/30/24 284 lb (128.8 kg)   09/23/24 287 lb (130.2 kg)   09/05/24 286 lb 12.8 oz (130.1 kg)   08/30/24 280 lb (127 kg)   04/16/24 279 lb (126.6 kg)   02/02/24 288 lb 9.6 oz (130.9 kg)     Body mass index is 41.94 kg/m².     Cholesterol, Total (mg/dL)   Date Value   02/12/2023 151     HDL Cholesterol (mg/dL)   Date Value   02/12/2023 35 (L)     LDL Cholesterol (mg/dL)   Date Value   02/12/2023 94        Current Outpatient Medications   Medication Sig Dispense Refill    Phentermine HCl 15 MG Oral Cap Take 1 capsule (15 mg total) by mouth every morning. 90 capsule 0    semaglutide-weight management (WEGOVY) 2.4 MG/0.75ML Subcutaneous Solution Auto-injector Inject 0.75 mL (2.4 mg total) into the skin once a week. 3 mL 2    Meloxicam 15 MG Oral Tab Take 1 tablet (15 mg total) by mouth daily. After one week, switch to as needed. Do not take ibuprofen with this. 14 tablet 0    tiZANidine HCl 2 MG Oral Cap Take 1 capsule (2 mg total) by mouth 3 (three) times daily. 30 capsule 0    Multiple Vitamin (MULTI-VITAMIN DAILY) Oral  Tab Take by mouth.        Patient Active Problem List   Diagnosis    Iron deficiency anemia    History of  section    Obesity without serious comorbidity    Vitamin D deficiency    Therapeutic drug monitoring    Obesity, morbid, BMI 40.0-49.9 ([facility])     Past Medical History:    Anemia    Hypothyroidism    Incompetent cervix in pregnancy, antepartum, second trimester ([facility])    Ovarian hyperstimulation syndrome    PCOS (polycystic ovarian syndrome)      Past Surgical History:   Procedure Laterality Date    Follicle punc,retrieval of oocyte      Other surgical history      ectopic pregnancy      Family History   Problem Relation Age of Onset    High Blood Pressure Mother     Diabetes Father     High Blood Pressure Father     Heart Disease Father     Heart Surgery Father     No Known Problems Maternal Grandmother     No Known Problems Maternal Grandfather     No Known Problems Paternal Grandmother     No Known Problems Paternal Grandfather     Other (Other) Daughter          shunt    Breast Cancer Maternal Aunt 40    Cancer Maternal Aunt         liver    Blood Cancer Paternal Aunt       Social History:   Social History     Socioeconomic History    Marital status:    Tobacco Use    Smoking status: Never     Passive exposure: Never    Smokeless tobacco: Never   Vaping Use    Vaping status: Never Used   Substance and Sexual Activity    Alcohol use: No    Drug use: No    Sexual activity: Yes     Partners: Male     Birth control/protection: Depo Provera   Other Topics Concern    Caffeine Concern Yes     Comment: 1cups of coffee daily    Exercise No    Seat Belt Yes    Self-Exams Yes     Comment: self breast exam with showering        REVIEW OF SYSTEMS:   GENERAL: feels well otherwise  LUNGS: denies shortness of breath  CARDIOVASCULAR: denies chest pain  GI: denies abdominal pain,  No constipation or diarrhea  : denies dysuria, vaginal discharge or itching    EXAM:   /82   Pulse 88   Temp  98.1 °F (36.7 °C)   Resp 16   Ht 5' 9\" (1.753 m)   Wt 284 lb (128.8 kg)   LMP 09/29/2024 (Exact Date)   BMI 41.94 kg/m²   Body mass index is 41.94 kg/m².   GENERAL: well developed, well nourished,in no apparent distress  HEENT: atraumatic, normocephalic, TMs normal  EYES:PERRLA, EOMI,conjunctiva are clear  NECK: supple,no adenopathy, no thyromegaly  BREAST: /  LUNGS: clear to auscultation  CARDIO: RRR without murmur  GI: good BS's,no masses, HSM or tenderness  :/  EXTREMITIES: no edema    ASSESSMENT AND PLAN:   Jil High is a 34 year old female who presents for a complete physical exam.  Encounter Diagnoses   Name Primary?    Routine general medical examination at a health care facility     Need for influenza vaccination Yes     Pap:  UTD with GYN  Mammogram:  Age 41.    Dexascan:  /.  Labs:  ordered  Colonoscopy:  Age 46  Vaccines: flu today  The patient is asked to return for CPX in 1 year.  Orders Placed This Encounter   Procedures    Fluzone trivalent vaccine, PF 0.5mL, 6mo+ ([id number])       Meds & Refills for this Visit:  Requested Prescriptions      No prescriptions requested or ordered in this encounter       Imaging & Consults:  INFLUENZA VACCINE, TRI, PRESERV FREE, 0.5 ML

## 2024-10-01 DIAGNOSIS — Z51.81 THERAPEUTIC DRUG MONITORING: ICD-10-CM

## 2024-10-01 DIAGNOSIS — E66.01 OBESITY, MORBID, BMI 40.0-49.9 (HCC): ICD-10-CM

## 2024-10-03 RX ORDER — PHENTERMINE HYDROCHLORIDE 15 MG/1
15 CAPSULE ORAL EVERY MORNING
Qty: 30 CAPSULE | Refills: 2 | Status: SHIPPED | OUTPATIENT
Start: 2024-10-03

## 2024-10-03 NOTE — TELEPHONE ENCOUNTER
Requesting   Requested Prescriptions     Pending Prescriptions Disp Refills    Phentermine HCl 15 MG Oral Cap 90 capsule 0     Sig: Take 1 capsule (15 mg total) by mouth every morning.       LOV: 09/23/2024  RTC: in about 5 months  Filled: 09/23/2024 #90 with 0 refills    Future Appointments   Date Time Provider Department Center   2/4/2025  1:20 PM Nell Sanabria APRN [facility] [address]   5/5/2025  9:20 AM Nell Sanabria APRN [facility] [address]     Pharmacy is asking for a note stating that you are aware i am ordering it a week early due to misplacing my previous one

## 2024-11-12 ENCOUNTER — TELEPHONE (OUTPATIENT)
Dept: FAMILY MEDICINE CLINIC | Facility: CLINIC | Age: 34
End: 2024-11-12

## 2024-11-12 NOTE — TELEPHONE ENCOUNTER
Spoke with Tess WALKER, Pharmacist at [facility]. Fluoxetine 20mg #30 was ordered by Ortega Mendez PA-C. Tess [facility] will only pay for #90 not #30. Discussed with Ortega Mendez. OK to order #90

## 2024-12-31 ENCOUNTER — TELEPHONE (OUTPATIENT)
Dept: INTERNAL MEDICINE CLINIC | Facility: CLINIC | Age: 34
End: 2024-12-31

## 2024-12-31 NOTE — TELEPHONE ENCOUNTER
Approved    Prior authorization approved  Payer: [facility] [place] Case ID: [id number]    [phone]    [phone]  Note from payer: Your PA request has been approved.  Additional information will be provided in the approval communication. (Message 1145)  Approval Details    Authorized from December 31, 2024 to December 31, 2025  Electronic appeal: Not supported  View History  Medication Being Authorized    semaglutide-weight management (WEGOVY) 2.4 MG/0.75ML Subcutaneous Solution Auto-injector  Inject 0.75 mL (2.4 mg total) into the skin once a week.  Dispense: 3 mL Refills: 2   Start: 9/23/2024   Class: Normal Diagnoses: Therapeutic drug monitoring; Obesity, morbid, BMI 40.0-49.9 (HCC)   This order has been released to its destination.  To be filled at: [facility] [address] [place], [place] - [address] [phone], [phone]

## 2025-01-15 DIAGNOSIS — G47.9 SLEEP DISTURBANCE: ICD-10-CM

## 2025-01-15 DIAGNOSIS — F41.1 GENERALIZED ANXIETY DISORDER: ICD-10-CM

## 2025-01-16 NOTE — TELEPHONE ENCOUNTER
Refill request for:    Requested Prescriptions     Pending Prescriptions Disp Refills    ALPRAZOLAM 0.5 MG Oral Tab [Pharmacy Med Name: ALPRAZOLAM 0.5 MG TABLET] 20 tablet 0     Sig: TAKE 1 TABLET BY MOUTH NIGHTLY AS NEEDED FOR ANXIETY        Last Prescribed Quantity Refills   12/11/24 20 0     LOV 1/15/2025     Patient was asked to follow-up in: Follow-up not documented in note    Appointment scheduled: Visit date not found    Medication not on protocol.     # 20 with 0 refills routed to DAVID Jurado for review

## 2025-01-17 RX ORDER — ALPRAZOLAM 0.5 MG
0.5 TABLET ORAL NIGHTLY PRN
Qty: 30 TABLET | Refills: 1 | Status: SHIPPED | OUTPATIENT
Start: 2025-01-17

## 2025-02-04 ENCOUNTER — OFFICE VISIT (OUTPATIENT)
Dept: INTERNAL MEDICINE CLINIC | Facility: CLINIC | Age: 35
End: 2025-02-04
Payer: COMMERCIAL

## 2025-02-04 VITALS
DIASTOLIC BLOOD PRESSURE: 70 MMHG | RESPIRATION RATE: 16 BRPM | BODY MASS INDEX: 39.1 KG/M2 | SYSTOLIC BLOOD PRESSURE: 120 MMHG | HEIGHT: 69 IN | WEIGHT: 264 LBS | HEART RATE: 88 BPM

## 2025-02-04 DIAGNOSIS — E78.6 LOW HDL (UNDER 40): ICD-10-CM

## 2025-02-04 DIAGNOSIS — E66.01 OBESITY, MORBID, BMI 40.0-49.9 (HCC): ICD-10-CM

## 2025-02-04 DIAGNOSIS — E55.9 VITAMIN D DEFICIENCY: ICD-10-CM

## 2025-02-04 DIAGNOSIS — Z51.81 THERAPEUTIC DRUG MONITORING: Primary | ICD-10-CM

## 2025-02-04 PROCEDURE — 3074F SYST BP LT 130 MM HG: CPT | Performed by: NURSE PRACTITIONER

## 2025-02-04 PROCEDURE — 3008F BODY MASS INDEX DOCD: CPT | Performed by: NURSE PRACTITIONER

## 2025-02-04 PROCEDURE — 3078F DIAST BP <80 MM HG: CPT | Performed by: NURSE PRACTITIONER

## 2025-02-04 PROCEDURE — 99214 OFFICE O/P EST MOD 30 MIN: CPT | Performed by: NURSE PRACTITIONER

## 2025-02-04 RX ORDER — PHENTERMINE HYDROCHLORIDE 15 MG/1
15 CAPSULE ORAL EVERY MORNING
Qty: 30 CAPSULE | Refills: 2 | Status: CANCELLED | OUTPATIENT
Start: 2025-02-04

## 2025-02-04 RX ORDER — SEMAGLUTIDE 2.4 MG/.75ML
0.75 INJECTION, SOLUTION SUBCUTANEOUS WEEKLY
Qty: 3 ML | Refills: 2 | Status: CANCELLED | OUTPATIENT
Start: 2025-02-04

## 2025-02-04 NOTE — PATIENT INSTRUCTIONS
Next steps:  1.  Fill your prescribed medication and take as discussed and prescribed: wegovy 2.4mg weekly and phentermine 15mg   2.  Schedule a personal nutrition consultation with one of our registered dieticians     Please try to work on the following dietary changes:  Daily protein recommendation to start:  grams  Daily carbohydrate: <160g  Daily calories: 1,800-1,900  1.  Drink water with meals and throughout the day, cut down on soda and/or juice if consumed. Consider flavored water options like Bubbly, Spindrift, Hint and Anuj.  2.  Eat breakfast daily and focus on having protein with each meal, examples include: greek yogurt, cottage cheese, hard boiled egg, whole grain toast with peanut butter.   3.  Reduce refined carbohydrates and sugars which includes items such as sweets, as well as rice, pasta, and bread and make sure to choose whole grain options when having them with just 1 serving per meal about the size of your inner palm.  4.  Consume non starchy veggies daily working towards making them a good 50% of your daily food intake. Add them to lunch and dinner consistently.  5.  Start a daily probiotic: VSL#3 is recommended, (order on line at www.vsl3.com). Take 1 capsule daily with water for 30 days, then reduce to 1 every other day (this will reduce the cost). Capsules can be left out for 2 weeks, but then must be refrigerated.      Please download saw My Fitness Pal, LoseIt! Or Net Diary to monitor daily dietary intake and you will be able to see if you are eating the right amount of calories or too much or too little which would hinder weight loss. Additionally this will help to see your daily carbohydrate and protein intake. When you set the saw up choose 1-2 lbs/week as a goal.  Keeping a paper food journal is an option as well to remain accountable for your choices- this is the start to mindful eating! A low calorie diet has been consistently shown to support weight loss.     Continue or  start exercising to help establish a routine. If not already exercising begin with 1 day and progress as able with long-term goal of 30 minutes 5 days a week at a minimum.     Meditation daily can help manage and control stress. Chronic stress can make weight loss difficult.  Exercising is one way to help with stress, but meditation using the CALM Arleth or another comparable alternative can be done in your home or place of work with little time commitment. This Arleth can also help work on behavior change and improve sleep. Check out the segment under Calm Masterclass and listen to The 4 Pillars of Health. A great way to begin learning about the foundation of lifestyle with practical tips to use in your every day.     Check out www.yourweightmatters.org blog for continued daily support and education along this weight loss journey!    Patient Resources:     Personal Training/Fitness Classes/Health Coaching     [facility] @ [email or web address].org/healthy-driven/fitness-center Full fitness center with group fitness and personal training. Discount available as client of [facility].  Health Coaching and Personal Training with Merced Palmre at our [place] [facility]- individual weekly coaching with option to add personal training and small group fitness classes targeted at weight loss- [phone] and/or email @ [email or web address]  360FIT [place] http://www.[email or web address] Software Systems. Group Fitness [phone] and/or email Kelly at [email or web address]  FrancRh[facility] Hospitaled Fitness Centers with multiple locations: POET Technologies (www.Intoan Technology), Eat The Frog Fitness (www.InstaMed.Urbasolar), Fit Body Bootcamp (www.ShopparitybodybootRodatip.Urbasolar), Bizzby Fitness (www.Buzz Media.Urbasolar), The Exercise  (www.exercisecoach.Urbasolar)     Online Fitness  Fitness  on AppSocially  Fit in 10 DVD series- www.wjocs89NIS.com  Sit and Be Fit - Chair exercise series  Www.sitandbefit.org  Hip Hop Fit with Denver Hall at www.hiphopfit.net     Apps for on the Go Fitness  [employer] 7 Minute Workout (orange box with white 7) - free on the go HIIT training arleth  Peloton Arleth @ www.onepeloton.com     Nutrition Trackers and Tools  LoseIT! And My Fitness Pal apps and on line for tracking nutrition  NOOM - virtual health coaching  FitFoundation (healthy meals on the go) in [place] @ www.cnhakwhbzzotx9vHardPoint Protective Group  Markie [place] @ wwwSCC Eaglebistromd.com and Jdkyrd89 (keto and low carb plans recommended) @ www.iyhxlv96.com, Metabolic Meals @ www.MyMetabolicMeals.com - individual prepared meals to go  Gobble, Blue Apron, Home , Every Plate, Sunbasket- on line meal delivery programs for preparation at home  Meal Village in [place] for homemade meals to go @ www.mealAutogeneration Marketingage.[employer]  Diet Doctor @ www.dietdoctor.[employer] - low carb swaps  Yummly - meal prep and planning arleth (www.yummly.com)     Stress Management/Behavior/Mindful Eating  CALM meditation arleth (www.calm.com)  Headspace  Am I Hungry? Mindful eating virtual  arleth  Www.yourweightmatters.org - Obesity Action Coalition sponsored Blog posts daily  Motivation arleth (black box with white \")- daily supportive messages sent to your phone     Books/Video Education/Podcasts  Mindless Eating by Ady Sotelo  Why We Get Sick by Valerio Ugarte (a book about insulin resistance)  Atomic Habits by Arun Victoria (a book about taking small steps to promote greater behavior change)   Can't Hurt Me by Shakir Zaragoza (a book exploring the power of discipline in achieving your goals)  The End of Dieting: How to Live for Life by Dr. Reji Amador M.D. or listen to The Medical Technologies International Podcast Episode 63: Understanding \"Nutritarian\" Eating w/Dr. Reji Amador  Your Body in Balance: The New Science of Food, Hormones, and Health by Dr. Parminder Tomlinson  The Menopause Diet Plan by Roxie Bonner and Maria T Garcia  The Complete Guide to fasting by Dr. Briseno  Sugar, Salt & Fat by Madyson  Dieudonne, Ph.D, R.D.  Weight Loss Surgery Will Not Treat Food Addiction by Chanel Schafer Ph.D  The Game Changers- CyberPatrolix Documentary on plant based nutrition  Fed Up - documentary about obesity (Free on Utube)  The Truth About Sugar - documentary on sugar (Free on Utube, https://youtu.be/0V0trwlES5i)  The Dr. Doherty T5 Wellness Plan by Dr. Elmer Doherty MD  Fitlosophy Fitspiration - journal to better health (found at [employer] in fitness aisle)  What Happened to You?- a look at the impact trauma has on behavior written by David Vicente and Dr. Bradford Cormier  Whole Again by Sagar Bañuelos - discovering your true self after trauma  Brian Salcedo talk on Social Yupp[employer], The Call to Courage  Podcasts: The Exam Room by the Physician's Committee, Nutrition Facts by Dr. Beck    We are here to support you with weight loss, but please remember that you still need your primary care provider for your routine health maintenance.

## 2025-02-04 NOTE — PROGRESS NOTES
HISTORY OF PRESENT ILLNESS  Chief Complaint   Patient presents with    Weight Check     Down 23     Jil High is a 34 year old female here for follow up with medical weight loss program for the treatment of overweight, obesity, or morbid obesity.     Down 23 lbs (f/u from 9/2024)  Compliant on wegovy 2.4mg weekly and phentermine 15mg  Tolerating well, helping with decreasing appetite and no side effects     Labral tear in right hip (doing PT) - did injection to see if would help with pain  Back pain has improved with PT (had slipped disc)  Is traveling to [facility] next week  Exercise/Activity: 3x/ week, via PT-for right hip  Nutrition: eating regular meals, +protein, minimal veggies. not tracking reports  Meals out per week on average: 1  Stress is manageable   Sleep: 7 hours/night, waking up feeling rested (with Zoloft)     Denies chest pain, shortness of breath, dizziness, blurred vision, headache, paresthesia, nausea/vomiting.     Breakfast Lunch Dinner Snacks Fluids   Reviewed              Wt Readings from Last 6 Encounters:   02/04/25 264 lb (119.7 kg)   01/15/25 263 lb (119.3 kg)   12/11/24 271 lb (122.9 kg)   11/07/24 273 lb 3.2 oz (123.9 kg)   09/30/24 284 lb (128.8 kg)   09/23/24 287 lb (130.2 kg)          REVIEW OF SYSTEMS  GENERAL: feels well otherwise, denied any fevers chills or night sweats   LUNGS: denies shortness of breath  CARDIOVASCULAR: denies chest pain  GI: denies abdominal pain  MUSCULOSKELETAL: +back pain (right hip), joint pains   PSYCH: denies change in behavior or mood, denies feeling sad or depressed    EXAM  /70   Pulse 88   Resp 16   Ht 5' 9\" (1.753 m)   Wt 264 lb (119.7 kg)   LMP 11/08/2024 (Exact Date)   BMI 38.99 kg/m²       GENERAL: well developed, well nourished, in no apparent distress, A/O x3  SKIN: no rashes, no suspicious lesions  HEENT: atraumatic, normocephalic, OP-clear, PERRLA  NECK: supple, no adenopathy  LUNGS: CTA in all fields, breathing non  labored  CARDIO: RRR without murmur  GI: +BS, NT/ND, no masses or HSM  EXTREMITIES: no cyanosis, no clubbing, no edema    Lab Results   Component Value Date     (H) 04/18/2023    BUN 11 04/18/2023    BUNCREA 12.4 12/27/2019    CREATSERUM 0.99 04/18/2023    ANIONGAP 4 04/18/2023    GFRNAA 89 09/22/2021    GFRAA 103 09/22/2021    CA 8.8 04/18/2023    OSMOCALC 286 04/18/2023    ALKPHO 73 04/18/2023    AST 23 04/18/2023    ALT 34 04/18/2023    BILT 0.2 04/18/2023    TP 7.5 04/18/2023    ALB 3.4 04/18/2023    GLOBULIN 4.1 04/18/2023     04/18/2023    K 3.4 (L) 04/18/2023     04/18/2023    CO2 24.0 04/18/2023     No results found for: \"EAG\", \"A1C\"  Lab Results   Component Value Date    CHOLEST 151 02/12/2023    TRIG 122 02/12/2023    HDL 35 (L) 02/12/2023    LDL 94 02/12/2023    VLDL 20 02/12/2023    NONHDLC 116 02/12/2023     No results found for: \"B12\", \"VITB12\"  Lab Results   Component Value Date    VITD 8.9 (L) 02/12/2023       Medications Ordered Prior to Encounter[1]    ASSESSMENT/PLAN    ICD-10-CM    1. Therapeutic drug monitoring  Z51.81       2. Obesity, morbid, BMI 40.0-49.9 (HCC)  E66.01       3. Vitamin D deficiency  E55.9       4. Low HDL (under 40)  E78.6           PLAN   Initial Weight Data and Goal Weight Loss:  Initial consult: #304 lbs on 3/2023  Weight Calculations  Initial Weight: 304 lbs  Initial Weight Date: 03/01/23  Today's Weight: 264 lbs  5% Goal: 15.2  10% Goal: 30.4  Total Weight Loss: 40 lbs  Total weight loss: Down 23 lbs total, Net loss 40 lbs  Continue with medications: wegovy 2.4mg weekly   Continue with medications: phentermine 15mg (educated to stop taking if anxiety increases)  --advised of side effects and adverse effects of this medication  Contradictions: has done phentermine in the past    Reviewed labs  Continue with vitamin d (high dose from PCP)   HLD- low HDL, lifestyle education done, follows with PCP   physical exercise/ activity is low (see HPI)   Wrote out  macros and encouraged tracking  Nutrition: Low carb diet, recommended to eat breakfast daily/ regular protein intake  Follow up with dietitian and psychologist as recommended.  Discussed the role of sleep and stress in weight management.  Counseled on comprehensive weight loss plan including attention to nutrition, exercise and behavior/stress management for success. See patient instruction below for more details.  Discussed strategies to overcome barriers to successful weight loss and weight maintenance  FITTE: ACSM recommendations (150-300 minutes/ week in active weight loss)   Weight Loss Consent to treat reviewed and signed.    Total time spent on chart review, pre-charting, obtaining history, counseling, and educating, reviewing labs was 30 minutes.       NOTE TO PATIENT: The 21st Century Cures Act makes clinical notes like these available to patients in the interest of transparency. Clinical notes are medical documents used by physicians and care providers to communicate with each other. These documents include medical language and terminology, abbreviations, and treatment information that may sound technical and at times possibly unfamiliar. In addition, at times, the verbiage may appear blunt or direct. These documents are one tool providers use to communicate relevant information and clinical opinions of the care providers in a way that allows common understanding of the clinical context.     There are no Patient Instructions on file for this visit.    No follow-ups on file.    Patient verbalizes understanding.    POLI Huizar             [1]   Current Outpatient Medications on File Prior to Visit   Medication Sig Dispense Refill    ALPRAZolam 0.5 MG Oral Tab Take 1 tablet (0.5 mg total) by mouth nightly as needed for Anxiety. 30 tablet 1    sertraline 100 MG Oral Tab Take 1 tablet (100 mg total) by mouth daily. 90 tablet 0    traZODone 50 MG Oral Tab Take 1 tablet (50 mg total) by mouth nightly  as needed for Sleep. 90 tablet 0    Phentermine HCl 15 MG Oral Cap Take 1 capsule (15 mg total) by mouth every morning. 30 capsule 2    semaglutide-weight management (WEGOVY) 2.4 MG/0.75ML Subcutaneous Solution Auto-injector Inject 0.75 mL (2.4 mg total) into the skin once a week. 3 mL 2    Meloxicam 15 MG Oral Tab Take 1 tablet (15 mg total) by mouth daily. After one week, switch to as needed. Do not take ibuprofen with this. 14 tablet 0    Multiple Vitamin (MULTI-VITAMIN DAILY) Oral Tab Take by mouth.       No current facility-administered medications on file prior to visit.

## 2025-04-04 ENCOUNTER — HOSPITAL ENCOUNTER (EMERGENCY)
Facility: HOSPITAL | Age: 35
Discharge: HOME | End: 2025-04-04
Attending: EMERGENCY MEDICINE
Payer: COMMERCIAL

## 2025-04-04 VITALS
OXYGEN SATURATION: 98 % | WEIGHT: 259 LBS | HEIGHT: 69 IN | BODY MASS INDEX: 38.36 KG/M2 | DIASTOLIC BLOOD PRESSURE: 68 MMHG | RESPIRATION RATE: 20 BRPM | TEMPERATURE: 98 F | SYSTOLIC BLOOD PRESSURE: 106 MMHG | HEART RATE: 97 BPM

## 2025-04-04 DIAGNOSIS — J02.9 EXUDATIVE PHARYNGITIS: Primary | ICD-10-CM

## 2025-04-04 LAB
ALBUMIN SERPL-MCNC: 4.8 G/DL (ref 3.2–4.8)
ALBUMIN/GLOB SERPL: 1.7 {RATIO} (ref 1–2)
ALP LIVER SERPL-CCNC: 77 U/L
ALT SERPL-CCNC: 14 U/L
ANION GAP SERPL CALC-SCNC: 9 MMOL/L (ref 0–18)
AST SERPL-CCNC: 13 U/L (ref ?–34)
BASOPHILS # BLD AUTO: 0.07 X10(3) UL (ref 0–0.2)
BASOPHILS NFR BLD AUTO: 0.4 %
BILIRUB SERPL-MCNC: 0.9 MG/DL (ref 0.3–1.2)
BUN BLD-MCNC: 9 MG/DL (ref 9–23)
CALCIUM BLD-MCNC: 9.4 MG/DL (ref 8.7–10.6)
CHLORIDE SERPL-SCNC: 103 MMOL/L (ref 98–112)
CO2 SERPL-SCNC: 26 MMOL/L (ref 21–32)
CREAT BLD-MCNC: 1.01 MG/DL
EGFRCR SERPLBLD CKD-EPI 2021: 75 ML/MIN/1.73M2 (ref 60–?)
EOSINOPHIL # BLD AUTO: 0.09 X10(3) UL (ref 0–0.7)
EOSINOPHIL NFR BLD AUTO: 0.6 %
ERYTHROCYTE [DISTWIDTH] IN BLOOD BY AUTOMATED COUNT: 15.3 %
FLUAV + FLUBV RNA SPEC NAA+PROBE: NEGATIVE
FLUAV + FLUBV RNA SPEC NAA+PROBE: NEGATIVE
GLOBULIN PLAS-MCNC: 2.9 G/DL (ref 2–3.5)
GLUCOSE BLD-MCNC: 114 MG/DL (ref 70–99)
HCT VFR BLD AUTO: 38.4 %
HGB BLD-MCNC: 12.4 G/DL
IMM GRANULOCYTES # BLD AUTO: 0.1 X10(3) UL (ref 0–1)
IMM GRANULOCYTES NFR BLD: 0.6 %
LYMPHOCYTES # BLD AUTO: 2.24 X10(3) UL (ref 1–4)
LYMPHOCYTES NFR BLD AUTO: 14.2 %
MCH RBC QN AUTO: 24.8 PG (ref 26–34)
MCHC RBC AUTO-ENTMCNC: 32.3 G/DL (ref 31–37)
MCV RBC AUTO: 77 FL
MONOCYTES # BLD AUTO: 1.41 X10(3) UL (ref 0.1–1)
MONOCYTES NFR BLD AUTO: 9 %
NEUTROPHILS # BLD AUTO: 11.83 X10 (3) UL (ref 1.5–7.7)
NEUTROPHILS # BLD AUTO: 11.83 X10(3) UL (ref 1.5–7.7)
NEUTROPHILS NFR BLD AUTO: 75.2 %
OSMOLALITY SERPL CALC.SUM OF ELEC: 286 MOSM/KG (ref 275–295)
PLATELET # BLD AUTO: 261 10(3)UL (ref 150–450)
POTASSIUM SERPL-SCNC: 3.7 MMOL/L (ref 3.5–5.1)
PROT SERPL-MCNC: 7.7 G/DL (ref 5.7–8.2)
RBC # BLD AUTO: 4.99 X10(6)UL
RSV RNA SPEC NAA+PROBE: NEGATIVE
SARS-COV-2 RNA RESP QL NAA+PROBE: NOT DETECTED
SODIUM SERPL-SCNC: 138 MMOL/L (ref 136–145)
WBC # BLD AUTO: 15.7 X10(3) UL (ref 4–11)

## 2025-04-04 PROCEDURE — 80053 COMPREHEN METABOLIC PANEL: CPT | Performed by: EMERGENCY MEDICINE

## 2025-04-04 PROCEDURE — 87430 STREP A AG IA: CPT | Performed by: EMERGENCY MEDICINE

## 2025-04-04 PROCEDURE — 99284 EMERGENCY DEPT VISIT MOD MDM: CPT

## 2025-04-04 PROCEDURE — 0241U SARS-COV-2/FLU A AND B/RSV BY PCR (GENEXPERT): CPT | Performed by: EMERGENCY MEDICINE

## 2025-04-04 PROCEDURE — 85025 COMPLETE CBC W/AUTO DIFF WBC: CPT | Performed by: EMERGENCY MEDICINE

## 2025-04-04 PROCEDURE — 96361 HYDRATE IV INFUSION ADD-ON: CPT

## 2025-04-04 PROCEDURE — 96374 THER/PROPH/DIAG INJ IV PUSH: CPT

## 2025-04-04 RX ORDER — DEXAMETHASONE SODIUM PHOSPHATE 4 MG/ML
10 VIAL (ML) INJECTION ONCE
Status: COMPLETED | OUTPATIENT
Start: 2025-04-04 | End: 2025-04-04

## 2025-04-04 NOTE — ED PROVIDER NOTES
Patient Seen in: [facility] Emergency Department      History     Chief Complaint   Patient presents with    Shortness Of Breath     Stated Complaint: pt states shortness of breath sore throat, ear infection    Subjective:   HPI      34-year-old female comes to the hospital complaint having difficulty with right ear pain and a sore throat.  She has been having fevers, chills and bodyaches and has felt out of sorts and confused at times.  She felt feverish but did not take her temperature.  She denies any significant headaches, neck pain or stiffness.  She did take Augmentin from [place] from [place] that she has had 2 doses.  She denies any significant runny nose, sneeze or cough.  She is no abdominal pains.  Denies any vomiting or diarrhea.  She denies any dysuria.  She is no other complaints at this time.    Objective:     Past Medical History:    Anemia    Hypothyroidism    Incompetent cervix in pregnancy, antepartum, second trimester (HCC)    Ovarian hyperstimulation syndrome    PCOS (polycystic ovarian syndrome)              Past Surgical History:   Procedure Laterality Date    Follicle punc,retrieval of oocyte      Other surgical history  2014    ectopic pregnancy    Prior classical                   No pertinent social history.                Physical Exam     ED Triage Vitals   BP 25 0538 127/81   Pulse 25 0538 (!) 121   Resp 25 0538 22   Temp 25 0541 98.4 °F (36.9 °C)   Temp src 25 0541 Oral   SpO2 25 0538 97 %   O2 Device 25 0538 None (Room air)       Current Vitals:   Vital Signs  BP: 106/68  Pulse: 97  Resp: 20  Temp: 98.4 °F (36.9 °C)  Temp src: Oral  MAP (mmHg): 81    Oxygen Therapy  SpO2: 98 %  O2 Device: None (Room air)        Physical Exam the patient was awake and alert without obvious distress  HEENT; NCAT, EOMI, throat bilateral erythema noted with swollen tonsils bilaterally on the right somewhat greater  than left with exudate noted, neck supple, no LAD, no JVD, TMs clear  Heart S1S2 RRR  lungs: CTAB  abd: Soft NT, ND,  NABS without rebound or guarding  Ext no C/C/E    ED Course     Labs Reviewed   CBC WITH DIFFERENTIAL WITH PLATELET - Abnormal; Notable for the following components:       Result Value    WBC 15.7 (*)     MCV 77.0 (*)     MCH 24.8 (*)     Neutrophil Absolute Prelim 11.83 (*)     Neutrophil Absolute 11.83 (*)     Monocyte Absolute 1.41 (*)     All other components within normal limits   COMP METABOLIC PANEL (14) - Abnormal; Notable for the following components:    Glucose 114 (*)     All other components within normal limits   SARS-COV-2/FLU A AND B/RSV BY PCR (GENEXPERT) - Normal    Narrative:     This test is intended for the qualitative detection and differentiation of SARS-CoV-2, influenza A, influenza B, and respiratory syncytial virus (RSV) viral RNA in nasopharyngeal or nares swabs from individuals suspected of respiratory viral infection consistent with COVID-19 by their healthcare provider. Signs and symptoms of respiratory viral infection due to SARS-CoV-2, influenza, and RSV can be similar.    Test performed using the Xpert Xpress SARS-CoV-2/FLU/RSV (real time RT-PCR)  assay on the [employer] instrument, [employer], [place] [address].   This test is being used under the Food and Drug Administration's Emergency Use Authorization.    The authorized Fact Sheet for Healthcare Providers for this assay is available upon request from the laboratory.   RAPID STREP A SCREEN (LC) - Normal    Narrative:     A confirmatory culture is recommended if clinically indicated.       ED Course as of 04/04/25 0731  ------------------------------------------------------------  Time: 04/04 0730  Comment: While the patient's white count is 15.7 thousand.  The patient's hemoglobin was normal.  The CMP was unremarkable.  The COVID influenza and RSV were negative.  The rapid strep was negative.  She was given IV fluid  a liter and dexamethasone.              MDM      Differential diagnosis includes strep throat, viral pharyngitis but not limited to these.  Patient has had antibiotics prior.  Patient was given Decadron while here and we discharged home with antibiotics and further patient management care.    Patient was screened and evaluated during this visit.   As a treating physician attending to the patient, I determined, within reasonable clinical confidence and prior to discharge, that an emergency medical condition was not or was no longer present.  There was no indication for further evaluation, treatment or admission on an emergency basis.       The usual and customary discharge instuctions were discussed given the patient's ER course.  We discussed signs and symptoms that should prompt the patient's immediate return to the emergency department.   Reasonable over the counter and prescription treatment options and Physician follow up plan was discussed.       The patient is discharged in good condition.     This note was prepared using Dragon Medical voice recognition dictation software.  As a result errors may occur.  When identified to these areas have been corrected.  While every attempt is made to correct errors during dictation discrepancies may still exist.  Please contact if there are any errors.        Medical Decision Making      Disposition and Plan     Clinical Impression:  1. Exudative pharyngitis         Disposition:  Discharge  4/4/2025  7:31 am    Follow-up:  Luba Cardoso MD  [address] [place] [id number] [phone]    Schedule an appointment as soon as possible for a visit in 2 day(s)            Medications Prescribed:  Current Discharge Medication List        START taking these medications    Details   amoxicillin clavulanate 875-125 MG Oral Tab Take 1 tablet by mouth 2 (two) times daily for 10 days.  Qty: 20 tablet, Refills: 0                 Supplementary Documentation:

## 2025-04-04 NOTE — ED INITIAL ASSESSMENT (HPI)
Patient complains of shortness of breath, cold sx starting Wednesday. [place] infection and swollen glands

## 2025-04-04 NOTE — ED QUICK NOTES
Rounding Completed taken over from [facility]     Plan of Care reviewed. Waiting for ivf to infuse.  Elimination needs assessed.  Provided comfort measures.    Bed is locked and in lowest position. Call light within reach.

## 2025-04-09 DIAGNOSIS — E66.01 OBESITY, MORBID, BMI 40.0-49.9 (HCC): ICD-10-CM

## 2025-04-09 DIAGNOSIS — Z51.81 THERAPEUTIC DRUG MONITORING: ICD-10-CM

## 2025-04-10 DIAGNOSIS — E66.01 OBESITY, MORBID, BMI 40.0-49.9 (HCC): ICD-10-CM

## 2025-04-10 DIAGNOSIS — Z51.81 THERAPEUTIC DRUG MONITORING: ICD-10-CM

## 2025-04-10 RX ORDER — SEMAGLUTIDE 2.4 MG/.75ML
0.75 INJECTION, SOLUTION SUBCUTANEOUS WEEKLY
Qty: 3 ML | Refills: 2 | OUTPATIENT
Start: 2025-04-10

## 2025-04-11 ENCOUNTER — PATIENT MESSAGE (OUTPATIENT)
Dept: INTERNAL MEDICINE CLINIC | Facility: CLINIC | Age: 35
End: 2025-04-11

## 2025-04-11 DIAGNOSIS — E66.01 OBESITY, MORBID, BMI 40.0-49.9 (HCC): ICD-10-CM

## 2025-04-11 DIAGNOSIS — Z51.81 THERAPEUTIC DRUG MONITORING: ICD-10-CM

## 2025-04-11 RX ORDER — SEMAGLUTIDE 2.4 MG/.75ML
0.75 INJECTION, SOLUTION SUBCUTANEOUS WEEKLY
Refills: 2 | OUTPATIENT
Start: 2025-04-11

## 2025-04-11 RX ORDER — PHENTERMINE HYDROCHLORIDE 15 MG/1
15 CAPSULE ORAL EVERY MORNING
Qty: 30 CAPSULE | Refills: 2 | Status: SHIPPED | OUTPATIENT
Start: 2025-04-11

## 2025-04-11 RX ORDER — SEMAGLUTIDE 2.4 MG/.75ML
0.75 INJECTION, SOLUTION SUBCUTANEOUS WEEKLY
Qty: 3 ML | Refills: 2 | Status: SHIPPED | OUTPATIENT
Start: 2025-04-11

## 2025-04-11 NOTE — TELEPHONE ENCOUNTER
Requesting wegovy and phentemrine  LOV: 2/4/25  RTC: 5 months  Last Relevant Labs: na  Filled: 9/23/24 #3 ml with 2 refills  wegovy 2.4 mg  Filled: 10/3/24 #30 with 2 refills   phentermine  last filled 3/5/25 #30 for 30 days     Future Appointments   Date Time Provider Department Center   5/5/2025  9:20 AM Nell Sanabria APRN [facility]   9/8/2025 11:40 AM Nell Sanabria APRN [facility]     2/4/25 264#   now 255#

## 2025-04-14 DIAGNOSIS — G47.9 SLEEP DISTURBANCE: ICD-10-CM

## 2025-04-14 DIAGNOSIS — F41.1 GENERALIZED ANXIETY DISORDER: ICD-10-CM

## 2025-04-14 RX ORDER — SERTRALINE HYDROCHLORIDE 100 MG/1
100 TABLET, FILM COATED ORAL DAILY
Qty: 90 TABLET | Refills: 0 | Status: SHIPPED | OUTPATIENT
Start: 2025-04-14

## 2025-04-14 RX ORDER — TRAZODONE HYDROCHLORIDE 50 MG/1
50 TABLET ORAL NIGHTLY PRN
Qty: 90 TABLET | Refills: 0 | Status: SHIPPED | OUTPATIENT
Start: 2025-04-14

## 2025-04-14 NOTE — TELEPHONE ENCOUNTER
Requested Prescriptions     Pending Prescriptions Disp Refills    TRAZODONE 50 MG Oral Tab [Pharmacy Med Name: TRAZODONE 50 MG TABLET] 90 tablet 0     Sig: TAKE 1 TABLET BY MOUTH NIGHTLY AS NEEDED FOR SLEEP     Signed Prescriptions Disp Refills    SERTRALINE 100 MG Oral Tab 90 tablet 0     Sig: TAKE 1 TABLET BY MOUTH EVERY DAY     Authorizing Provider: ROCHELLE CORREIA     Ordering User: AKIN SMITH      Refilled per protocol/OV notes     Trazadone has no protocol.

## 2025-05-05 ENCOUNTER — OFFICE VISIT (OUTPATIENT)
Dept: INTERNAL MEDICINE CLINIC | Facility: CLINIC | Age: 35
End: 2025-05-05
Payer: COMMERCIAL

## 2025-05-05 VITALS
BODY MASS INDEX: 39.25 KG/M2 | DIASTOLIC BLOOD PRESSURE: 72 MMHG | RESPIRATION RATE: 18 BRPM | OXYGEN SATURATION: 97 % | HEART RATE: 89 BPM | WEIGHT: 265 LBS | HEIGHT: 69 IN | SYSTOLIC BLOOD PRESSURE: 110 MMHG

## 2025-05-05 DIAGNOSIS — E78.6 LOW HDL (UNDER 40): ICD-10-CM

## 2025-05-05 DIAGNOSIS — E66.01 OBESITY, MORBID, BMI 40.0-49.9 (HCC): ICD-10-CM

## 2025-05-05 DIAGNOSIS — Z51.81 THERAPEUTIC DRUG MONITORING: Primary | ICD-10-CM

## 2025-05-05 DIAGNOSIS — E55.9 VITAMIN D DEFICIENCY: ICD-10-CM

## 2025-05-05 PROCEDURE — 3078F DIAST BP <80 MM HG: CPT | Performed by: NURSE PRACTITIONER

## 2025-05-05 PROCEDURE — 99214 OFFICE O/P EST MOD 30 MIN: CPT | Performed by: NURSE PRACTITIONER

## 2025-05-05 PROCEDURE — 3074F SYST BP LT 130 MM HG: CPT | Performed by: NURSE PRACTITIONER

## 2025-05-05 PROCEDURE — 3008F BODY MASS INDEX DOCD: CPT | Performed by: NURSE PRACTITIONER

## 2025-05-05 NOTE — PATIENT INSTRUCTIONS
Next steps:  1.  Fill your prescribed medication and take as discussed and prescribed: wegovy 2.4mg weekly and phentermine 15mg   2.  Schedule a personal nutrition consultation with one of our registered dieticians     Please try to work on the following dietary changes:  Daily protein recommendation to start:  grams  Daily carbohydrate: <155g  Daily calories: 1,800-1,900  1.  Drink water with meals and throughout the day, cut down on soda and/or juice if consumed. Consider flavored water options like Bubbly, Spindrift, Hint and Anuj.  2.  Eat breakfast daily and focus on having protein with each meal, examples include: greek yogurt, cottage cheese, hard boiled egg, whole grain toast with peanut butter.   3.  Reduce refined carbohydrates and sugars which includes items such as sweets, as well as rice, pasta, and bread and make sure to choose whole grain options when having them with just 1 serving per meal about the size of your inner palm.  4.  Consume non starchy veggies daily working towards making them a good 50% of your daily food intake. Add them to lunch and dinner consistently.  5.  Start a daily probiotic: VSL#3 is recommended, (order on line at www.vsl3.com). Take 1 capsule daily with water for 30 days, then reduce to 1 every other day (this will reduce the cost). Capsules can be left out for 2 weeks, but then must be refrigerated.      Please download saw My Fitness Pal, LoseIt! Or Net Diary to monitor daily dietary intake and you will be able to see if you are eating the right amount of calories or too much or too little which would hinder weight loss. Additionally this will help to see your daily carbohydrate and protein intake. When you set the saw up choose 1-2 lbs/week as a goal.  Keeping a paper food journal is an option as well to remain accountable for your choices- this is the start to mindful eating! A low calorie diet has been consistently shown to support weight loss.     Continue or  start exercising to help establish a routine. If not already exercising begin with 1 day and progress as able with long-term goal of 30 minutes 5 days a week at a minimum.     Meditation daily can help manage and control stress. Chronic stress can make weight loss difficult.  Exercising is one way to help with stress, but meditation using the CALM Arleth or another comparable alternative can be done in your home or place of work with little time commitment. This Arleth can also help work on behavior change and improve sleep. Check out the segment under Calm Masterclass and listen to The 4 Pillars of Health. A great way to begin learning about the foundation of lifestyle with practical tips to use in your every day.     Check out www.yourweightmatters.org blog for continued daily support and education along this weight loss journey!    Patient Resources:     Personal Training/Fitness Classes/Health Coaching     [facility] @ [email or web address] Full fitness center with group fitness and personal training. Discount available as client of [employer] [facility].  Health Coaching and Personal Training with Merced Palmer at our [place] [facility]- individual weekly coaching with option to add personal training and small group fitness classes targeted at weight loss- [phone] and/or email @ [email or web address]  360FIT [place] [email or web address] Group Fitness [phone] and/or email Kelly at [email or web address]  [facility] Fitness Centers with multiple locations: fluIT Biosystems (www.Commtimize), Eat The Frog Fitness (www.Simple.TV.Celect), Fit Body Bootcamp (www.Pulse TherapeuticsbodybootLightSail Educationp.Celect), Mojix Fitness (www.Ziqitza Health Care.Celect), The Exercise  (www.exercisecoach.Celect)     Online Fitness  Fitness  on ENDOTRONIX  Fit in 10 DVD series- www.jcoxa62TFX.com  Sit and Be Fit - Chair exercise series  Www.sitandbefit.org  Hip Hop Fit with Denver Hall at www.hiphopfit.net     Apps for on the Go Fitness  ScanSafe 7 Minute Workout (orange box with white 7) - free on the go HIIT training arleth  Peloton Arleth @ www.onepeloton.com     Nutrition Trackers and Tools  LoseIT! And My Fitness Pal apps and on line for tracking nutrition  NOOM - virtual health coaching  FitFoundation (healthy meals on the go) in [place] @ www.kqphidoqxvtvw9zTrademob  Markie AL @ wwwAdmittedlybistromd.com and Zjorri14 (keto and low carb plans recommended) @ www.xqmudy07.com, Metabolic Meals @ www.MyMetabolicMeals.com - individual prepared meals to go  Gobble, Blue Apron, Home , Every Plate, Sunbasket- on line meal delivery programs for preparation at home  Meal Village in [place] for homemade meals to go @ www.mealSafetyCultureage.Sound Clips  Diet Doctor @ www.dietdoctor.Sound Clips - low carb swaps  Yummly - meal prep and planning arleth (www.yummly.com)     Stress Management/Behavior/Mindful Eating  CALM meditation arleth (www.calm.com)  Headspace  Am I Hungry? Mindful eating virtual  arleth  Www.yourweightmatters.org - Obesity Action Coalition sponsored Blog posts daily  Motivation arleth (black box with white \")- daily supportive messages sent to your phone     Books/Video Education/Podcasts  Mindless Eating by Ady Sotelo  Why We Get Sick by Valerio Ugarte (a book about insulin resistance)  Atomic Habits by Arun Victoria (a book about taking small steps to promote greater behavior change)   Can't Hurt Me by Shakir Zaragoza (a book exploring the power of discipline in achieving your goals)  The End of Dieting: How to Live for Life by Dr. Reji Amador M.D. or listen to The Asteres Podcast Episode 63: Understanding \"Nutritarian\" Eating w/Dr. Reji Amador  Your Body in Balance: The New Science of Food, Hormones, and Health by Dr. Parminder Tomlinson  The Menopause Diet Plan by Roxie Bonner and Maria T Garcia  The Complete Guide to fasting by Dr. Briseno  Sugar, Salt & Fat by Madyson  Dieudonne, Ph.D, R.D.  Weight Loss Surgery Will Not Treat Food Addiction by Chanel Schafer Ph.D  The Game Changers- Unicorn Productionix Documentary on plant based nutrition  Fed Up - documentary about obesity (Free on Utube)  The Truth About Sugar - documentary on sugar (Free on Utube, https://youtu.be/7F5cgrbSP2y)  The Dr. Doherty T5 Wellness Plan by Dr. Elmer Doherty MD  Fitlosophy Fitspiration - journal to better health (found at [employer] in fitness aisle)  What Happened to You?- a look at the impact trauma has on behavior written by David Vicente and Dr. Bradford Cormier  Whole Again by Sagar Bañuelos - discovering your true self after trauma  Brian Salcedo talk on Svpply, The Call to Courage  Podcasts: The Exam Room by the Physician's Committee, Nutrition Facts by Dr. Beck    We are here to support you with weight loss, but please remember that you still need your primary care provider for your routine health maintenance.

## 2025-05-05 NOTE — PROGRESS NOTES
HISTORY OF PRESENT ILLNESS  Chief Complaint   Patient presents with    Weight Check     Up 1 lb(2/04/25)     Jil High is a 34 year old female here for follow up with medical weight loss program for the treatment of overweight, obesity, or morbid obesity.     Up #1 lbs (f/u from 2/2025)  Compliant on wegovy 2.4mg weekly and phentermine 15mg  Tolerating well, helping with decreasing appetite and no side effects     Feels like hunger has increased   Has been going to the gym more  was fasting for the month of Ramadan  High stress- daughter had seizure   Started on zoloft - is sleeping better  Still dealing with labral tear and right hip and currently doing PT  Will be working from home for the next year  Exercise/Activity: 3x/ week, via strength training- limited due to labral tear (right hip), PT 1 day per week  Nutrition: eating regular meals, +protein, minimal veggies. not tracking reports  Meals out per week on average: 1  Stress is manageable   Sleep: 6 hours/night, waking up feeling rested    Denies chest pain, shortness of breath, dizziness, blurred vision, headache, paresthesia, nausea/vomiting.     Breakfast Lunch Dinner Snacks Fluids   Reviewed              Wt Readings from Last 6 Encounters:   05/05/25 265 lb (120.2 kg)   04/04/25 259 lb (117.5 kg)   02/04/25 264 lb (119.7 kg)   01/15/25 263 lb (119.3 kg)   12/11/24 271 lb (122.9 kg)   11/07/24 273 lb 3.2 oz (123.9 kg)          REVIEW OF SYSTEMS  GENERAL: feels well otherwise, denied any fevers chills or night sweats   LUNGS: denies shortness of breath  CARDIOVASCULAR: denies chest pain  GI: denies abdominal pain  MUSCULOSKELETAL: +back pain (right hip), joint pains   PSYCH: denies change in behavior or mood, denies feeling sad or depressed    EXAM  /72   Pulse 89   Resp 18   Ht 5' 9\" (1.753 m)   Wt 265 lb (120.2 kg)   LMP 04/12/2025 (Exact Date)   SpO2 97%   BMI 39.13 kg/m²       GENERAL: well developed, well nourished, in no apparent  distress, A/O x3  SKIN: no rashes, no suspicious lesions  HEENT: atraumatic, normocephalic, OP-clear, PERRLA  NECK: supple, no adenopathy  LUNGS: CTA in all fields, breathing non labored  CARDIO: RRR without murmur  GI: +BS, NT/ND, no masses or HSM  EXTREMITIES: no cyanosis, no clubbing, no edema    Lab Results   Component Value Date     (H) 04/04/2025    BUN 9 04/04/2025    BUNCREA 12.4 12/27/2019    CREATSERUM 1.01 04/04/2025    ANIONGAP 9 04/04/2025    GFRNAA 89 09/22/2021    GFRAA 103 09/22/2021    CA 9.4 04/04/2025    OSMOCALC 286 04/04/2025    ALKPHO 77 04/04/2025    AST 13 04/04/2025    ALT 14 04/04/2025    BILT 0.9 04/04/2025    TP 7.7 04/04/2025    ALB 4.8 04/04/2025    GLOBULIN 2.9 04/04/2025     04/04/2025    K 3.7 04/04/2025     04/04/2025    CO2 26.0 04/04/2025     No results found for: \"EAG\", \"A1C\"  Lab Results   Component Value Date    CHOLEST 151 02/12/2023    TRIG 122 02/12/2023    HDL 35 (L) 02/12/2023    LDL 94 02/12/2023    VLDL 20 02/12/2023    NONHDLC 116 02/12/2023     No results found for: \"B12\", \"VITB12\"  Lab Results   Component Value Date    VITD 8.9 (L) 02/12/2023       Medications Ordered Prior to Encounter[1]    ASSESSMENT/PLAN    ICD-10-CM    1. Therapeutic drug monitoring  Z51.81       2. Obesity, morbid, BMI 40.0-49.9 (HCC)  E66.01       3. Vitamin D deficiency  E55.9       4. Low HDL (under 40)  E78.6           PLAN   Initial Weight Data and Goal Weight Loss:  Initial consult: #304 lbs on 3/2023  Weight Calculations  Initial Weight: 304 lbs  Initial Weight Date: 03/01/23  Today's Weight: 265 lbs  5% Goal: 15.2  10% Goal: 30.4  Total Weight Loss: 39 lbs  Total weight loss: up #1 lbs total, Net loss 39 lbs  Continue with medications: wegovy 2.4mg weekly   He is going to check with insurance to see if Zepbound would be covered and if so we will switch Wegovy to Zepbound  Continue with medications: phentermine 15mg (educated to stop taking if anxiety  increases)  --advised of side effects and adverse effects of this medication  Contradictions:  has done phentermine in the past    Reviewed labs  Continue with vitamin d (high dose from PCP)   HLD- low HDL, lifestyle education done, follows with PCP   Stress, see HPI   Exercise as tolerated, think about doing aqua therapy   Wrote out macros and encouraged tracking  Nutrition: Low carb diet, recommended to eat breakfast daily/ regular protein intake  Follow up with dietitian and psychologist as recommended.  Discussed the role of sleep and stress in weight management.  Counseled on comprehensive weight loss plan including attention to nutrition, exercise and behavior/stress management for success. See patient instruction below for more details.  Discussed strategies to overcome barriers to successful weight loss and weight maintenance  FITTE: ACSM recommendations (150-300 minutes/ week in active weight loss)   Weight Loss Consent to treat reviewed and signed.    Total time spent on chart review, pre-charting, obtaining history, counseling, and educating, reviewing labs was 30 minutes.       NOTE TO PATIENT: The 21st Century Cures Act makes clinical notes like these available to patients in the interest of transparency. Clinical notes are medical documents used by physicians and care providers to communicate with each other. These documents include medical language and terminology, abbreviations, and treatment information that may sound technical and at times possibly unfamiliar. In addition, at times, the verbiage may appear blunt or direct. These documents are one tool providers use to communicate relevant information and clinical opinions of the care providers in a way that allows common understanding of the clinical context.     There are no Patient Instructions on file for this visit.    No follow-ups on file.    Patient verbalizes understanding.    POLI Huizar             [1]   Current Outpatient  Medications on File Prior to Visit   Medication Sig Dispense Refill    SERTRALINE 100 MG Oral Tab TAKE 1 TABLET BY MOUTH EVERY DAY 90 tablet 0    semaglutide-weight management (WEGOVY) 2.4 MG/0.75ML Subcutaneous Solution Auto-injector Inject 0.75 mL (2.4 mg total) into the skin once a week. 3 mL 2    Phentermine HCl 15 MG Oral Cap Take 1 capsule (15 mg total) by mouth every morning. 30 capsule 2    ALPRAZolam 0.5 MG Oral Tab Take 1 tablet (0.5 mg total) by mouth nightly as needed for Anxiety. 30 tablet 1    Meloxicam 15 MG Oral Tab Take 1 tablet (15 mg total) by mouth daily. After one week, switch to as needed. Do not take ibuprofen with this. 14 tablet 0    Multiple Vitamin (MULTI-VITAMIN DAILY) Oral Tab Take by mouth.      amoxicillin clavulanate 875-125 MG Oral Tab Take 1 tablet by mouth 2 (two) times daily. (Patient not taking: Reported on 5/5/2025)      TRAZODONE 50 MG Oral Tab TAKE 1 TABLET BY MOUTH NIGHTLY AS NEEDED FOR SLEEP (Patient not taking: Reported on 5/5/2025) 90 tablet 0     No current facility-administered medications on file prior to visit.

## 2025-07-04 DIAGNOSIS — Z51.81 THERAPEUTIC DRUG MONITORING: ICD-10-CM

## 2025-07-04 DIAGNOSIS — E66.01 OBESITY, MORBID, BMI 40.0-49.9 (HCC): ICD-10-CM

## 2025-07-07 RX ORDER — SEMAGLUTIDE 2.4 MG/.75ML
0.75 INJECTION, SOLUTION SUBCUTANEOUS WEEKLY
Qty: 3 ML | Refills: 2 | Status: SHIPPED | OUTPATIENT
Start: 2025-07-07

## 2025-07-07 NOTE — TELEPHONE ENCOUNTER
Requesting   Requested Prescriptions     Pending Prescriptions Disp Refills    WEGOVY 2.4 MG/0.75ML Subcutaneous Solution Auto-injector [Pharmacy Med Name: WEGOVY 2.4 MG/0.75 ML PEN]  2     Sig: INJECT 0.75 ML (2.4 MG TOTAL) INTO THE SKIN ONCE A WEEK.       LOV: 5/05/2025  RTC: 9/08/2025  Last Relevant Labs: na  Filled: 4/11/2025 #3 ml with 2 refills    Future Appointments   Date Time Provider Department Center   9/8/2025 11:40 AM Nell Sanabria APRN [facility]   12/22/2025 11:40 AM Nell Sanabria APRN [facility]

## 2025-07-12 DIAGNOSIS — F41.1 GENERALIZED ANXIETY DISORDER: ICD-10-CM

## 2025-07-12 DIAGNOSIS — G47.9 SLEEP DISTURBANCE: ICD-10-CM

## 2025-07-17 RX ORDER — SERTRALINE HYDROCHLORIDE 100 MG/1
100 TABLET, FILM COATED ORAL DAILY
Qty: 90 TABLET | Refills: 0 | Status: SHIPPED | OUTPATIENT
Start: 2025-07-17

## 2025-07-17 RX ORDER — TRAZODONE HYDROCHLORIDE 50 MG/1
50 TABLET ORAL NIGHTLY PRN
Qty: 90 TABLET | Refills: 0 | Status: SHIPPED | OUTPATIENT
Start: 2025-07-17

## 2025-07-17 NOTE — TELEPHONE ENCOUNTER
Please Review    Please review pended refill request as unable to refill due to high/very high interaction warning copied here:          High  Drug-Drug: sertraline and MeloxicamToxic effects may be increased with concurrent administration of NSAIDs and Selective Serotonin Reuptake Inhibitors. The risk of upper gastrointestinal bleeding may be increased. Patients taking both drugs concurrently should be educated about the signs and symptoms of GI bleeding.  Details  Override reason        sertraline 100 MG Oral Tab [Pharmacy Med Name: SERTRALINE  MG TABLET]  Prescription. Reordered.  Meloxicam 15 MG Oral TabPrescription. Active.  Discontinue  High  Drug-Drug: sertraline and traZODoneAdditive serotonergic effects may occur during coadministration of selective serotonin reuptake inhibitors (SSRIs) and Serotonergic Non-Opioid CNS Depressants, and the risk of developing serotonin syndrome may be increased.  Details  Override reason        sertraline 100 MG Oral Tab [Pharmacy Med Name: SERTRALINE  MG TABLET]  Prescription. Reordered.  TRAZODONE 50 MG Oral TabPrescription. Active.

## (undated) DEVICE — Device

## (undated) DEVICE — TRAXI RETRACTOR

## (undated) NOTE — ED AVS SNAPSHOT
Ulysses Mason   MRN: [id number]    Department:  [facility] Emergency Department   Date of Visit:  5/16/2018           Disclosure     Insurance plans vary and the physician(s) referred by the ER may not be covered by your plan.  Please contact your in tell this physician (or your personal doctor if your instructions are to return to your personal doctor) about any new or lasting problems. The primary care or specialist physician will see patients referred from the [facility] Emergency Department.  Hernán Arreola

## (undated) NOTE — LETTER
[facility]  [address] [facility], [address] [place] [address]    Consent for Operation    Date: __________________    Time: _______________    1.  I authorize the performance upon Aditya Bhakta the following operation:                              [place]    2 videotape. The [facility] will not be responsible for storage or maintenance of this tape. 6. For the purpose of advancing medical education, I consent to the admittance of observers to the Operating Room.     7. I authorize the use of any specimen, organs Signature of Patient:   ___________________________    When the patient is a minor or mentally incompetent to give consent:  Signature of person authorized to consent for patient: ___________________________   Relationship to patient: _____________________ 3. I understand how the anesthesia medicine will help me (benefits). 4. I understand that with any type of anesthesia medicine there are risks:  a.  The most common risks are: nausea, vomiting, sore throat, muscle soreness, damage to my eyes, mouth, or t _____________________________________________________________________________  Witness        Date   Time  I have verified that the signature is that of the patient or patient’s representative, and that it was signed before the procedure    Page 2 of 2

## (undated) NOTE — LETTER
Date & Time: 5/17/2018, 2:39 AM  Patient: Arun Butt  Encounter Provider(s):    Charliene Babinski, MD       To Whom It May Concern:    Arun Butt was seen and treated in our department on 5/16/2018. These excuse from work on May 17 and 18.     If you ha

## (undated) NOTE — LETTER
Date & Time: 5/17/2018, 2:39 AM  Patient: Nicholas Mueller  Encounter Provider(s):    Tim Garcia MD       To Whom It May Concern:    Nicholas Mueller was seen and treated in our department on 5/16/2018. Please excuse from work on May 17 and 18.     If yo

## (undated) NOTE — LETTER
[facility] [address], [address]    Consent for Operation    Date: _01/24/2019_________________    Time: ___1159____________    1.  I authorize the performance upon Elvis Canales the following operation: videotape. The \[facility]\"" will not be responsible for storage or maintenance of this tape. 6. For the purpose of advancing medical education, I consent to the admittance of observers to the Operating Room.     7. I authorize the use of any specimen, organs Signature of Patient:   ___________________________    When the patient is a minor or mentally incompetent to give consent:  Signature of person authorized to consent for patient: ___________________________   Relationship to patient: _____________________ 3. I understand how the anesthesia medicine will help me (benefits). 4. I understand that with any type of anesthesia medicine there are risks:  a.  The most common risks are: nausea, vomiting, sore throat, muscle soreness, damage to my eyes, mouth, or t _____________________________________________________________________________  Witness        Date   Time  I have verified that the signature is that of the patient or patient’s representative, and that it was signed before the procedure    Page 2 of 2

## (undated) NOTE — ED AVS SNAPSHOT
Lois Jennings   MRN: [id number]    Department:  [facility] Emergency Department   Date of Visit:  12/27/2019           Disclosure     Insurance plans vary and the physician(s) referred by the ER may not be covered by your plan.  Please contact your i tell this physician (or your personal doctor if your instructions are to return to your personal doctor) about any new or lasting problems. The primary care or specialist physician will see patients referred from the [facility] Emergency Department.  Bobby Vazquez

## (undated) NOTE — LETTER
[facility]  [address] [facility], [address] [place] [address]    Consent for Operation    Date: __________________    Time: _______________    1.  I authorize the performance upon Jasmine Luis the following operation:                              Raza Matthews videotape. The [facility] will not be responsible for storage or maintenance of this tape. 6. For the purpose of advancing medical education, I consent to the admittance of observers to the Operating Room.     7. I authorize the use of any specimen, organs Signature of Patient:   ___________________________    When the patient is a minor or mentally incompetent to give consent:  Signature of person authorized to consent for patient: ___________________________   Relationship to patient: _____________________ 3. I understand how the anesthesia medicine will help me (benefits). 4. I understand that with any type of anesthesia medicine there are risks:  a.  The most common risks are: nausea, vomiting, sore throat, muscle soreness, damage to my eyes, mouth, or t _____________________________________________________________________________  Witness        Date   Time  I have verified that the signature is that of the patient or patient’s representative, and that it was signed before the procedure    Page 2 of 2